# Patient Record
Sex: MALE | Race: ASIAN | Employment: UNEMPLOYED | URBAN - METROPOLITAN AREA
[De-identification: names, ages, dates, MRNs, and addresses within clinical notes are randomized per-mention and may not be internally consistent; named-entity substitution may affect disease eponyms.]

---

## 2017-03-10 ENCOUNTER — OFFICE VISIT (OUTPATIENT)
Dept: INTERNAL MEDICINE CLINIC | Age: 72
End: 2017-03-10

## 2017-03-10 VITALS
SYSTOLIC BLOOD PRESSURE: 120 MMHG | OXYGEN SATURATION: 96 % | HEART RATE: 54 BPM | DIASTOLIC BLOOD PRESSURE: 70 MMHG | BODY MASS INDEX: 30.05 KG/M2 | WEIGHT: 187 LBS | RESPIRATION RATE: 18 BRPM | HEIGHT: 66 IN

## 2017-03-10 DIAGNOSIS — Z11.59 NEED FOR HEPATITIS C SCREENING TEST: ICD-10-CM

## 2017-03-10 DIAGNOSIS — Z12.5 SCREENING PSA (PROSTATE SPECIFIC ANTIGEN): ICD-10-CM

## 2017-03-10 DIAGNOSIS — I10 ESSENTIAL HYPERTENSION: ICD-10-CM

## 2017-03-10 DIAGNOSIS — E11.9 TYPE 2 DIABETES MELLITUS WITHOUT COMPLICATION, WITHOUT LONG-TERM CURRENT USE OF INSULIN (HCC): ICD-10-CM

## 2017-03-10 DIAGNOSIS — Z00.00 PREVENTATIVE HEALTH CARE: Primary | ICD-10-CM

## 2017-03-10 DIAGNOSIS — E78.2 MIXED HYPERLIPIDEMIA: ICD-10-CM

## 2017-03-10 LAB
A/G RATIO: 2 (ref 1.1–2.2)
ALBUMIN SERPL-MCNC: 4.6 G/DL (ref 3.4–5)
ALP BLD-CCNC: 42 U/L (ref 40–129)
ALT SERPL-CCNC: 23 U/L (ref 10–40)
ANION GAP SERPL CALCULATED.3IONS-SCNC: 18 MMOL/L (ref 3–16)
AST SERPL-CCNC: 23 U/L (ref 15–37)
BASOPHILS ABSOLUTE: 0.1 K/UL (ref 0–0.2)
BASOPHILS RELATIVE PERCENT: 0.9 %
BILIRUB SERPL-MCNC: 0.5 MG/DL (ref 0–1)
BUN BLDV-MCNC: 19 MG/DL (ref 7–20)
CALCIUM SERPL-MCNC: 9.6 MG/DL (ref 8.3–10.6)
CHLORIDE BLD-SCNC: 97 MMOL/L (ref 99–110)
CHOLESTEROL, TOTAL: 121 MG/DL (ref 0–199)
CO2: 23 MMOL/L (ref 21–32)
CREAT SERPL-MCNC: 1.1 MG/DL (ref 0.8–1.3)
EOSINOPHILS ABSOLUTE: 0.3 K/UL (ref 0–0.6)
EOSINOPHILS RELATIVE PERCENT: 4.2 %
GFR AFRICAN AMERICAN: >60
GFR NON-AFRICAN AMERICAN: >60
GLOBULIN: 2.3 G/DL
GLUCOSE BLD-MCNC: 112 MG/DL (ref 70–99)
HBA1C MFR BLD: 6.3 %
HCT VFR BLD CALC: 44.2 % (ref 40.5–52.5)
HDLC SERPL-MCNC: 57 MG/DL (ref 40–60)
HEMOGLOBIN: 14.8 G/DL (ref 13.5–17.5)
LDL CHOLESTEROL CALCULATED: 46 MG/DL
LYMPHOCYTES ABSOLUTE: 1.8 K/UL (ref 1–5.1)
LYMPHOCYTES RELATIVE PERCENT: 27.7 %
MCH RBC QN AUTO: 30.3 PG (ref 26–34)
MCHC RBC AUTO-ENTMCNC: 33.4 G/DL (ref 31–36)
MCV RBC AUTO: 90.8 FL (ref 80–100)
MONOCYTES ABSOLUTE: 0.5 K/UL (ref 0–1.3)
MONOCYTES RELATIVE PERCENT: 8.2 %
NEUTROPHILS ABSOLUTE: 3.8 K/UL (ref 1.7–7.7)
NEUTROPHILS RELATIVE PERCENT: 59 %
PDW BLD-RTO: 13.5 % (ref 12.4–15.4)
PLATELET # BLD: 255 K/UL (ref 135–450)
PMV BLD AUTO: 8.1 FL (ref 5–10.5)
POTASSIUM SERPL-SCNC: 3.6 MMOL/L (ref 3.5–5.1)
PROSTATE SPECIFIC ANTIGEN: 1.13 NG/ML (ref 0–4)
RBC # BLD: 4.87 M/UL (ref 4.2–5.9)
SODIUM BLD-SCNC: 138 MMOL/L (ref 136–145)
T4 FREE: 1.5 NG/DL (ref 0.9–1.8)
TOTAL PROTEIN: 6.9 G/DL (ref 6.4–8.2)
TRIGL SERPL-MCNC: 91 MG/DL (ref 0–150)
TSH SERPL DL<=0.05 MIU/L-ACNC: 5.43 UIU/ML (ref 0.27–4.2)
VLDLC SERPL CALC-MCNC: 18 MG/DL
WBC # BLD: 6.5 K/UL (ref 4–11)

## 2017-03-10 PROCEDURE — 36415 COLL VENOUS BLD VENIPUNCTURE: CPT | Performed by: FAMILY MEDICINE

## 2017-03-10 PROCEDURE — 99397 PER PM REEVAL EST PAT 65+ YR: CPT | Performed by: FAMILY MEDICINE

## 2017-03-10 PROCEDURE — 83036 HEMOGLOBIN GLYCOSYLATED A1C: CPT | Performed by: FAMILY MEDICINE

## 2017-03-10 RX ORDER — TERBINAFINE HYDROCHLORIDE 250 MG/1
250 TABLET ORAL DAILY
Qty: 90 TABLET | Refills: 0 | Status: SHIPPED | OUTPATIENT
Start: 2017-03-10 | End: 2017-05-16 | Stop reason: ALTCHOICE

## 2017-03-10 RX ORDER — ATORVASTATIN CALCIUM 40 MG/1
TABLET, FILM COATED ORAL
Qty: 30 TABLET | Refills: 3 | Status: SHIPPED | OUTPATIENT
Start: 2017-03-10 | End: 2017-07-16 | Stop reason: SDUPTHER

## 2017-03-10 ASSESSMENT — ENCOUNTER SYMPTOMS
ABDOMINAL PAIN: 0
SHORTNESS OF BREATH: 0
CONSTIPATION: 0
BLOOD IN STOOL: 0
TROUBLE SWALLOWING: 0
DIARRHEA: 0
VOICE CHANGE: 0

## 2017-03-10 ASSESSMENT — PATIENT HEALTH QUESTIONNAIRE - PHQ9
SUM OF ALL RESPONSES TO PHQ QUESTIONS 1-9: 0
2. FEELING DOWN, DEPRESSED OR HOPELESS: 0
SUM OF ALL RESPONSES TO PHQ9 QUESTIONS 1 & 2: 0
1. LITTLE INTEREST OR PLEASURE IN DOING THINGS: 0

## 2017-03-11 LAB — HEPATITIS C ANTIBODY INTERPRETATION: NORMAL

## 2017-04-19 RX ORDER — FENOFIBRATE 145 MG/1
TABLET, COATED ORAL
Qty: 90 TABLET | Refills: 1 | Status: SHIPPED | OUTPATIENT
Start: 2017-04-19 | End: 2017-09-11 | Stop reason: SDUPTHER

## 2017-05-15 ENCOUNTER — OFFICE VISIT (OUTPATIENT)
Dept: ORTHOPEDIC SURGERY | Age: 72
End: 2017-05-15

## 2017-05-15 VITALS
SYSTOLIC BLOOD PRESSURE: 135 MMHG | WEIGHT: 187 LBS | DIASTOLIC BLOOD PRESSURE: 72 MMHG | HEIGHT: 66 IN | TEMPERATURE: 96.5 F | RESPIRATION RATE: 16 BRPM | BODY MASS INDEX: 30.05 KG/M2 | HEART RATE: 57 BPM

## 2017-05-15 DIAGNOSIS — M17.0 PRIMARY OSTEOARTHRITIS OF BOTH KNEES: Primary | ICD-10-CM

## 2017-05-15 PROCEDURE — 99213 OFFICE O/P EST LOW 20 MIN: CPT | Performed by: PHYSICIAN ASSISTANT

## 2017-05-15 PROCEDURE — 20610 DRAIN/INJ JOINT/BURSA W/O US: CPT | Performed by: PHYSICIAN ASSISTANT

## 2017-05-16 ENCOUNTER — OFFICE VISIT (OUTPATIENT)
Dept: INTERNAL MEDICINE CLINIC | Age: 72
End: 2017-05-16

## 2017-05-16 VITALS
BODY MASS INDEX: 30.83 KG/M2 | HEART RATE: 62 BPM | DIASTOLIC BLOOD PRESSURE: 80 MMHG | RESPIRATION RATE: 18 BRPM | WEIGHT: 191 LBS | OXYGEN SATURATION: 98 % | SYSTOLIC BLOOD PRESSURE: 144 MMHG

## 2017-05-16 DIAGNOSIS — R06.6 HICCUPS: ICD-10-CM

## 2017-05-16 DIAGNOSIS — M17.0 PRIMARY OSTEOARTHRITIS OF BOTH KNEES: ICD-10-CM

## 2017-05-16 DIAGNOSIS — E11.9 TYPE 2 DIABETES MELLITUS WITHOUT COMPLICATION, WITHOUT LONG-TERM CURRENT USE OF INSULIN (HCC): ICD-10-CM

## 2017-05-16 DIAGNOSIS — I10 ESSENTIAL HYPERTENSION: ICD-10-CM

## 2017-05-16 LAB — HBA1C MFR BLD: 6.3 %

## 2017-05-16 PROCEDURE — 99214 OFFICE O/P EST MOD 30 MIN: CPT | Performed by: FAMILY MEDICINE

## 2017-05-16 PROCEDURE — 83036 HEMOGLOBIN GLYCOSYLATED A1C: CPT | Performed by: FAMILY MEDICINE

## 2017-05-16 RX ORDER — CHLORPROMAZINE HYDROCHLORIDE 25 MG/1
25 TABLET, FILM COATED ORAL 3 TIMES DAILY
Qty: 30 TABLET | Refills: 0 | Status: SHIPPED | OUTPATIENT
Start: 2017-05-16 | End: 2017-10-06 | Stop reason: ALTCHOICE

## 2017-05-16 RX ORDER — CHLORPROMAZINE HYDROCHLORIDE 25 MG/1
25 TABLET, FILM COATED ORAL 3 TIMES DAILY
Qty: 30 TABLET | Refills: 0 | Status: SHIPPED | OUTPATIENT
Start: 2017-05-16 | End: 2017-05-16 | Stop reason: SDUPTHER

## 2017-05-16 ASSESSMENT — ENCOUNTER SYMPTOMS
VOICE CHANGE: 0
TROUBLE SWALLOWING: 0
DIARRHEA: 0
ABDOMINAL PAIN: 0
SHORTNESS OF BREATH: 0
BLOOD IN STOOL: 0
CONSTIPATION: 0

## 2017-07-11 ENCOUNTER — OFFICE VISIT (OUTPATIENT)
Dept: INTERNAL MEDICINE CLINIC | Age: 72
End: 2017-07-11

## 2017-07-11 VITALS
WEIGHT: 184 LBS | RESPIRATION RATE: 16 BRPM | DIASTOLIC BLOOD PRESSURE: 80 MMHG | HEART RATE: 62 BPM | SYSTOLIC BLOOD PRESSURE: 138 MMHG | BODY MASS INDEX: 29.57 KG/M2 | HEIGHT: 66 IN

## 2017-07-11 DIAGNOSIS — K59.09 OTHER CONSTIPATION: Primary | ICD-10-CM

## 2017-07-11 PROCEDURE — 99213 OFFICE O/P EST LOW 20 MIN: CPT | Performed by: NURSE PRACTITIONER

## 2017-07-12 RX ORDER — POLYETHYLENE GLYCOL 3350 17 G/17G
17 POWDER, FOR SOLUTION ORAL DAILY
Qty: 500 G | Refills: 0 | COMMUNITY
Start: 2017-07-12 | End: 2017-08-14 | Stop reason: HOSPADM

## 2017-07-12 ASSESSMENT — ENCOUNTER SYMPTOMS
CONSTIPATION: 1
COUGH: 0
ABDOMINAL DISTENTION: 1

## 2017-07-14 PROBLEM — K35.30 ACUTE APPENDICITIS WITH LOCALIZED PERITONITIS: Status: ACTIVE | Noted: 2017-07-14

## 2017-07-17 ENCOUNTER — TELEPHONE (OUTPATIENT)
Dept: SURGERY | Age: 72
End: 2017-07-17

## 2017-07-17 RX ORDER — ATORVASTATIN CALCIUM 40 MG/1
40 TABLET, FILM COATED ORAL DAILY
Qty: 30 TABLET | Refills: 0 | Status: SHIPPED | OUTPATIENT
Start: 2017-07-17 | End: 2018-05-07 | Stop reason: SDUPTHER

## 2017-08-01 ENCOUNTER — OFFICE VISIT (OUTPATIENT)
Dept: SURGERY | Age: 72
End: 2017-08-01

## 2017-08-01 VITALS
SYSTOLIC BLOOD PRESSURE: 137 MMHG | BODY MASS INDEX: 28.87 KG/M2 | WEIGHT: 179.6 LBS | HEIGHT: 66 IN | DIASTOLIC BLOOD PRESSURE: 82 MMHG

## 2017-08-01 DIAGNOSIS — Z90.49 S/P LAPAROSCOPIC APPENDECTOMY: Primary | ICD-10-CM

## 2017-08-01 DIAGNOSIS — D17.0 LIPOMA OF HEAD: ICD-10-CM

## 2017-08-01 PROCEDURE — 99213 OFFICE O/P EST LOW 20 MIN: CPT | Performed by: SURGERY

## 2017-08-01 RX ORDER — HYDROCHLOROTHIAZIDE 25 MG/1
TABLET ORAL
Qty: 90 TABLET | Refills: 1 | Status: SHIPPED | OUTPATIENT
Start: 2017-08-01 | End: 2018-05-08 | Stop reason: SDUPTHER

## 2017-08-01 RX ORDER — ATENOLOL 50 MG/1
TABLET ORAL
Qty: 90 TABLET | Refills: 1 | Status: SHIPPED | OUTPATIENT
Start: 2017-08-01 | End: 2018-02-01 | Stop reason: SDUPTHER

## 2017-08-01 ASSESSMENT — ENCOUNTER SYMPTOMS
NAUSEA: 0
DIARRHEA: 0
ABDOMINAL DISTENTION: 0
SHORTNESS OF BREATH: 0
TROUBLE SWALLOWING: 0
BACK PAIN: 0
COUGH: 0
VOMITING: 0
ABDOMINAL PAIN: 0
COLOR CHANGE: 0
RECTAL PAIN: 0
CONSTIPATION: 0
BLOOD IN STOOL: 0

## 2017-08-04 ENCOUNTER — TELEPHONE (OUTPATIENT)
Dept: SURGERY | Age: 72
End: 2017-08-04

## 2017-08-14 ENCOUNTER — HOSPITAL ENCOUNTER (OUTPATIENT)
Dept: SURGERY | Age: 72
Discharge: HOME | End: 2017-08-14
Attending: SURGERY | Admitting: SURGERY

## 2017-08-14 VITALS
TEMPERATURE: 97.8 F | SYSTOLIC BLOOD PRESSURE: 140 MMHG | BODY MASS INDEX: 28.75 KG/M2 | RESPIRATION RATE: 18 BRPM | HEIGHT: 66 IN | OXYGEN SATURATION: 98 % | HEART RATE: 53 BPM | DIASTOLIC BLOOD PRESSURE: 84 MMHG | WEIGHT: 178.9 LBS

## 2017-08-14 DIAGNOSIS — L72.9 SKIN CYST: ICD-10-CM

## 2017-08-14 LAB
GLUCOSE BLD-MCNC: 109 MG/DL (ref 70–99)
PERFORMED ON: ABNORMAL

## 2017-08-14 PROCEDURE — 11441 EXC FACE-MM B9+MARG 0.6-1 CM: CPT | Performed by: SURGERY

## 2017-08-14 RX ORDER — ACETAMINOPHEN 325 MG/1
650 TABLET ORAL
Status: DISCONTINUED | OUTPATIENT
Start: 2017-08-14 | End: 2017-08-14

## 2017-08-14 ASSESSMENT — PAIN SCALES - GENERAL
PAINLEVEL_OUTOF10: 0
PAINLEVEL_OUTOF10: 0

## 2017-08-14 ASSESSMENT — PAIN DESCRIPTION - PAIN TYPE
TYPE: SURGICAL PAIN
TYPE: SURGICAL PAIN

## 2017-08-14 ASSESSMENT — PAIN - FUNCTIONAL ASSESSMENT: PAIN_FUNCTIONAL_ASSESSMENT: 0-10

## 2017-08-15 ENCOUNTER — TELEPHONE (OUTPATIENT)
Dept: SURGERY | Age: 72
End: 2017-08-15

## 2017-09-11 RX ORDER — FENOFIBRATE 145 MG/1
TABLET, COATED ORAL
Qty: 90 TABLET | Refills: 1 | Status: CANCELLED | OUTPATIENT
Start: 2017-09-11

## 2017-09-11 RX ORDER — FENOFIBRATE 145 MG/1
TABLET, COATED ORAL
Qty: 90 TABLET | Refills: 3 | Status: SHIPPED | OUTPATIENT
Start: 2017-09-11 | End: 2018-07-13 | Stop reason: SDUPTHER

## 2017-10-06 ENCOUNTER — OFFICE VISIT (OUTPATIENT)
Dept: INTERNAL MEDICINE CLINIC | Age: 72
End: 2017-10-06

## 2017-10-06 VITALS
SYSTOLIC BLOOD PRESSURE: 136 MMHG | DIASTOLIC BLOOD PRESSURE: 78 MMHG | HEART RATE: 58 BPM | OXYGEN SATURATION: 97 % | RESPIRATION RATE: 18 BRPM

## 2017-10-06 DIAGNOSIS — E11.9 TYPE 2 DIABETES MELLITUS WITHOUT COMPLICATION, WITHOUT LONG-TERM CURRENT USE OF INSULIN (HCC): ICD-10-CM

## 2017-10-06 DIAGNOSIS — E78.2 MIXED HYPERLIPIDEMIA: ICD-10-CM

## 2017-10-06 DIAGNOSIS — K64.8 INTERNAL BLEEDING HEMORRHOIDS: ICD-10-CM

## 2017-10-06 DIAGNOSIS — I10 ESSENTIAL HYPERTENSION: ICD-10-CM

## 2017-10-06 LAB — HBA1C MFR BLD: 5.9 %

## 2017-10-06 PROCEDURE — 99214 OFFICE O/P EST MOD 30 MIN: CPT | Performed by: FAMILY MEDICINE

## 2017-10-06 PROCEDURE — 83036 HEMOGLOBIN GLYCOSYLATED A1C: CPT | Performed by: FAMILY MEDICINE

## 2017-10-06 RX ORDER — HYDROCORTISONE ACETATE 25 MG/1
25 SUPPOSITORY RECTAL DAILY
Qty: 10 SUPPOSITORY | Refills: 1 | Status: SHIPPED | OUTPATIENT
Start: 2017-10-06 | End: 2018-05-08 | Stop reason: ALTCHOICE

## 2017-10-06 ASSESSMENT — ENCOUNTER SYMPTOMS
ABDOMINAL PAIN: 0
BLOOD IN STOOL: 0
DIARRHEA: 0
VOICE CHANGE: 0
TROUBLE SWALLOWING: 0
CONSTIPATION: 0
SHORTNESS OF BREATH: 0

## 2017-10-06 NOTE — PROGRESS NOTES
is warm. No rash noted. Psychiatric: He has a normal mood and affect. His behavior is normal.       Assessment:      1. Internal bleeding hemorrhoids - I prescribed Anusol suppositories daily for 10 days and referred the patient to colorectal surgeon Dr. Vibha Wan at [place] [address] high fiber diet -bread vegetables fruits. May also use Metamucil    2. Type 2 diabetes mellitus without complication, without long-term current use of insulin ([facility]) Controlled HbA1c today is 5.9%. Continue metformin 1000 mg twice a day    3. Essential hypertension -Controlled continue hydrochlorothiazide 25 mg daily and atenolol 50 mg daily. Consider adding losartan next visit for renal protection due to underlying diabetes    4. Mixed hyperlipidemia -Continue Lipitor 40 mg daily plus TriCor 145 mg daily             Plan:      As above.  Request to come back in 2-3 months and as needed

## 2017-10-31 RX ORDER — ATORVASTATIN CALCIUM 40 MG/1
TABLET, FILM COATED ORAL
Qty: 90 TABLET | Refills: 1 | Status: SHIPPED | OUTPATIENT
Start: 2017-10-31 | End: 2018-06-28 | Stop reason: SDUPTHER

## 2018-02-01 RX ORDER — ATENOLOL 25 MG/1
TABLET ORAL
Qty: 180 TABLET | Refills: 0 | OUTPATIENT
Start: 2018-02-01

## 2018-02-01 RX ORDER — ATENOLOL 50 MG/1
TABLET ORAL
Qty: 90 TABLET | Refills: 1 | Status: SHIPPED | OUTPATIENT
Start: 2018-02-01 | End: 2018-05-08 | Stop reason: SDUPTHER

## 2018-05-02 ENCOUNTER — OFFICE VISIT (OUTPATIENT)
Dept: ORTHOPEDIC SURGERY | Age: 73
End: 2018-05-02

## 2018-05-02 VITALS
WEIGHT: 180 LBS | BODY MASS INDEX: 28.93 KG/M2 | TEMPERATURE: 97 F | HEIGHT: 66 IN | HEART RATE: 58 BPM | SYSTOLIC BLOOD PRESSURE: 139 MMHG | DIASTOLIC BLOOD PRESSURE: 77 MMHG

## 2018-05-02 DIAGNOSIS — M17.0 PRIMARY OSTEOARTHRITIS OF BOTH KNEES: Primary | ICD-10-CM

## 2018-05-02 PROCEDURE — 1123F ACP DISCUSS/DSCN MKR DOCD: CPT | Performed by: PHYSICIAN ASSISTANT

## 2018-05-02 PROCEDURE — 4040F PNEUMOC VAC/ADMIN/RCVD: CPT | Performed by: PHYSICIAN ASSISTANT

## 2018-05-02 PROCEDURE — 3017F COLORECTAL CA SCREEN DOC REV: CPT | Performed by: PHYSICIAN ASSISTANT

## 2018-05-02 PROCEDURE — G8427 DOCREV CUR MEDS BY ELIG CLIN: HCPCS | Performed by: PHYSICIAN ASSISTANT

## 2018-05-02 PROCEDURE — G8417 CALC BMI ABV UP PARAM F/U: HCPCS | Performed by: PHYSICIAN ASSISTANT

## 2018-05-02 PROCEDURE — 99213 OFFICE O/P EST LOW 20 MIN: CPT | Performed by: PHYSICIAN ASSISTANT

## 2018-05-02 PROCEDURE — 20610 DRAIN/INJ JOINT/BURSA W/O US: CPT | Performed by: PHYSICIAN ASSISTANT

## 2018-05-02 PROCEDURE — 1036F TOBACCO NON-USER: CPT | Performed by: PHYSICIAN ASSISTANT

## 2018-05-08 ENCOUNTER — OFFICE VISIT (OUTPATIENT)
Dept: INTERNAL MEDICINE CLINIC | Age: 73
End: 2018-05-08

## 2018-05-08 VITALS
SYSTOLIC BLOOD PRESSURE: 128 MMHG | DIASTOLIC BLOOD PRESSURE: 70 MMHG | RESPIRATION RATE: 18 BRPM | HEART RATE: 54 BPM | WEIGHT: 184 LBS | OXYGEN SATURATION: 98 % | BODY MASS INDEX: 29.7 KG/M2

## 2018-05-08 DIAGNOSIS — E11.9 TYPE 2 DIABETES MELLITUS WITHOUT COMPLICATION, WITHOUT LONG-TERM CURRENT USE OF INSULIN (HCC): Primary | ICD-10-CM

## 2018-05-08 DIAGNOSIS — I10 ESSENTIAL HYPERTENSION: ICD-10-CM

## 2018-05-08 DIAGNOSIS — Z12.5 SCREENING PSA (PROSTATE SPECIFIC ANTIGEN): ICD-10-CM

## 2018-05-08 DIAGNOSIS — E78.2 MIXED HYPERLIPIDEMIA: ICD-10-CM

## 2018-05-08 DIAGNOSIS — M17.0 PRIMARY OSTEOARTHRITIS OF BOTH KNEES: ICD-10-CM

## 2018-05-08 LAB
A/G RATIO: 1.8 (ref 1.1–2.2)
ALBUMIN SERPL-MCNC: 4.8 G/DL (ref 3.4–5)
ALP BLD-CCNC: 39 U/L (ref 40–129)
ALT SERPL-CCNC: 20 U/L (ref 10–40)
ANION GAP SERPL CALCULATED.3IONS-SCNC: 18 MMOL/L (ref 3–16)
AST SERPL-CCNC: 20 U/L (ref 15–37)
BASOPHILS ABSOLUTE: 0.1 K/UL (ref 0–0.2)
BASOPHILS RELATIVE PERCENT: 0.6 %
BILIRUB SERPL-MCNC: 0.6 MG/DL (ref 0–1)
BUN BLDV-MCNC: 23 MG/DL (ref 7–20)
CALCIUM SERPL-MCNC: 10.3 MG/DL (ref 8.3–10.6)
CHLORIDE BLD-SCNC: 94 MMOL/L (ref 99–110)
CHOLESTEROL, FASTING: 123 MG/DL (ref 0–199)
CO2: 27 MMOL/L (ref 21–32)
CREAT SERPL-MCNC: 1.3 MG/DL (ref 0.8–1.3)
CREATININE URINE POCT: 158.5
EOSINOPHILS ABSOLUTE: 0 K/UL (ref 0–0.6)
EOSINOPHILS RELATIVE PERCENT: 0.4 %
GFR AFRICAN AMERICAN: >60
GFR NON-AFRICAN AMERICAN: 54
GLOBULIN: 2.7 G/DL
GLUCOSE FASTING: 117 MG/DL (ref 70–99)
HBA1C MFR BLD: 6.3 %
HCT VFR BLD CALC: 45.1 % (ref 40.5–52.5)
HDLC SERPL-MCNC: 66 MG/DL (ref 40–60)
HEMOGLOBIN: 15.7 G/DL (ref 13.5–17.5)
LDL CHOLESTEROL CALCULATED: 45 MG/DL
LYMPHOCYTES ABSOLUTE: 1.7 K/UL (ref 1–5.1)
LYMPHOCYTES RELATIVE PERCENT: 19.1 %
MCH RBC QN AUTO: 31.2 PG (ref 26–34)
MCHC RBC AUTO-ENTMCNC: 34.8 G/DL (ref 31–36)
MCV RBC AUTO: 89.6 FL (ref 80–100)
MICROALBUMIN/CREAT 24H UR: 11 MG/G{CREAT}
MICROALBUMIN/CREAT UR-RTO: 6.3
MONOCYTES ABSOLUTE: 0.7 K/UL (ref 0–1.3)
MONOCYTES RELATIVE PERCENT: 7.6 %
NEUTROPHILS ABSOLUTE: 6.3 K/UL (ref 1.7–7.7)
NEUTROPHILS RELATIVE PERCENT: 72.3 %
PDW BLD-RTO: 14.2 % (ref 12.4–15.4)
PLATELET # BLD: 343 K/UL (ref 135–450)
PMV BLD AUTO: 7.2 FL (ref 5–10.5)
POTASSIUM SERPL-SCNC: 4 MMOL/L (ref 3.5–5.1)
PROSTATE SPECIFIC ANTIGEN: 1.03 NG/ML (ref 0–4)
RBC # BLD: 5.03 M/UL (ref 4.2–5.9)
SODIUM BLD-SCNC: 139 MMOL/L (ref 136–145)
T4 FREE: 1.5 NG/DL (ref 0.9–1.8)
TOTAL PROTEIN: 7.5 G/DL (ref 6.4–8.2)
TRIGLYCERIDE, FASTING: 61 MG/DL (ref 0–150)
TSH SERPL DL<=0.05 MIU/L-ACNC: 4.43 UIU/ML (ref 0.27–4.2)
VLDLC SERPL CALC-MCNC: 12 MG/DL
WBC # BLD: 8.8 K/UL (ref 4–11)

## 2018-05-08 PROCEDURE — 1123F ACP DISCUSS/DSCN MKR DOCD: CPT | Performed by: FAMILY MEDICINE

## 2018-05-08 PROCEDURE — 4040F PNEUMOC VAC/ADMIN/RCVD: CPT | Performed by: FAMILY MEDICINE

## 2018-05-08 PROCEDURE — 3044F HG A1C LEVEL LT 7.0%: CPT | Performed by: FAMILY MEDICINE

## 2018-05-08 PROCEDURE — 3017F COLORECTAL CA SCREEN DOC REV: CPT | Performed by: FAMILY MEDICINE

## 2018-05-08 PROCEDURE — 1036F TOBACCO NON-USER: CPT | Performed by: FAMILY MEDICINE

## 2018-05-08 PROCEDURE — G8417 CALC BMI ABV UP PARAM F/U: HCPCS | Performed by: FAMILY MEDICINE

## 2018-05-08 PROCEDURE — G8427 DOCREV CUR MEDS BY ELIG CLIN: HCPCS | Performed by: FAMILY MEDICINE

## 2018-05-08 PROCEDURE — 82044 UR ALBUMIN SEMIQUANTITATIVE: CPT | Performed by: FAMILY MEDICINE

## 2018-05-08 PROCEDURE — 83036 HEMOGLOBIN GLYCOSYLATED A1C: CPT | Performed by: FAMILY MEDICINE

## 2018-05-08 PROCEDURE — 2022F DILAT RTA XM EVC RTNOPTHY: CPT | Performed by: FAMILY MEDICINE

## 2018-05-08 PROCEDURE — 99214 OFFICE O/P EST MOD 30 MIN: CPT | Performed by: FAMILY MEDICINE

## 2018-05-08 PROCEDURE — 36415 COLL VENOUS BLD VENIPUNCTURE: CPT | Performed by: FAMILY MEDICINE

## 2018-05-08 RX ORDER — ATENOLOL 50 MG/1
TABLET ORAL
Qty: 90 TABLET | Refills: 1 | Status: SHIPPED | OUTPATIENT
Start: 2018-05-08 | End: 2018-08-14 | Stop reason: SDUPTHER

## 2018-05-08 RX ORDER — HYDROCHLOROTHIAZIDE 25 MG/1
TABLET ORAL
Qty: 90 TABLET | Refills: 1 | Status: SHIPPED | OUTPATIENT
Start: 2018-05-08 | End: 2018-08-14 | Stop reason: SDUPTHER

## 2018-05-08 ASSESSMENT — ENCOUNTER SYMPTOMS
VOICE CHANGE: 0
CONSTIPATION: 0
ABDOMINAL PAIN: 0
DIARRHEA: 0
SHORTNESS OF BREATH: 0
TROUBLE SWALLOWING: 0
BLOOD IN STOOL: 0

## 2018-05-08 ASSESSMENT — PATIENT HEALTH QUESTIONNAIRE - PHQ9
SUM OF ALL RESPONSES TO PHQ9 QUESTIONS 1 & 2: 0
SUM OF ALL RESPONSES TO PHQ QUESTIONS 1-9: 0
1. LITTLE INTEREST OR PLEASURE IN DOING THINGS: 0
2. FEELING DOWN, DEPRESSED OR HOPELESS: 0

## 2018-06-28 RX ORDER — ATORVASTATIN CALCIUM 40 MG/1
40 TABLET, FILM COATED ORAL DAILY
Qty: 90 TABLET | Refills: 1 | Status: SHIPPED | OUTPATIENT
Start: 2018-06-28 | End: 2018-12-31 | Stop reason: SDUPTHER

## 2018-07-13 RX ORDER — FENOFIBRATE 145 MG/1
145 TABLET, COATED ORAL DAILY
Qty: 90 TABLET | Refills: 1 | Status: SHIPPED | OUTPATIENT
Start: 2018-07-13 | End: 2019-01-21 | Stop reason: SDUPTHER

## 2018-08-14 RX ORDER — HYDROCHLOROTHIAZIDE 25 MG/1
TABLET ORAL
Qty: 90 TABLET | Refills: 1 | Status: SHIPPED | OUTPATIENT
Start: 2018-08-14 | End: 2019-02-10 | Stop reason: SDUPTHER

## 2018-08-14 RX ORDER — ATENOLOL 50 MG/1
TABLET ORAL
Qty: 90 TABLET | Refills: 1 | Status: SHIPPED | OUTPATIENT
Start: 2018-08-14 | End: 2019-01-29 | Stop reason: SDUPTHER

## 2019-01-03 RX ORDER — ATORVASTATIN CALCIUM 40 MG/1
TABLET, FILM COATED ORAL
Qty: 90 TABLET | Refills: 0 | Status: SHIPPED | OUTPATIENT
Start: 2019-01-03 | End: 2019-04-04 | Stop reason: SDUPTHER

## 2019-01-11 RX ORDER — FENOFIBRATE 145 MG/1
TABLET, COATED ORAL
Qty: 90 TABLET | Refills: 0 | OUTPATIENT
Start: 2019-01-11

## 2019-01-21 RX ORDER — FENOFIBRATE 145 MG/1
145 TABLET, COATED ORAL DAILY
Qty: 30 TABLET | Refills: 0 | Status: SHIPPED | OUTPATIENT
Start: 2019-01-21 | End: 2019-02-14 | Stop reason: SDUPTHER

## 2019-01-29 ENCOUNTER — OFFICE VISIT (OUTPATIENT)
Dept: INTERNAL MEDICINE CLINIC | Age: 74
End: 2019-01-29
Payer: COMMERCIAL

## 2019-01-29 VITALS
DIASTOLIC BLOOD PRESSURE: 77 MMHG | RESPIRATION RATE: 18 BRPM | WEIGHT: 186.2 LBS | SYSTOLIC BLOOD PRESSURE: 120 MMHG | HEIGHT: 65 IN | BODY MASS INDEX: 31.02 KG/M2 | HEART RATE: 59 BPM

## 2019-01-29 DIAGNOSIS — E78.2 MIXED HYPERLIPIDEMIA: ICD-10-CM

## 2019-01-29 DIAGNOSIS — I10 ESSENTIAL HYPERTENSION: ICD-10-CM

## 2019-01-29 DIAGNOSIS — Z12.5 SCREENING PSA (PROSTATE SPECIFIC ANTIGEN): ICD-10-CM

## 2019-01-29 DIAGNOSIS — E11.9 TYPE 2 DIABETES MELLITUS WITHOUT COMPLICATION, WITHOUT LONG-TERM CURRENT USE OF INSULIN (HCC): Primary | ICD-10-CM

## 2019-01-29 DIAGNOSIS — Z23 NEED FOR INFLUENZA VACCINATION: ICD-10-CM

## 2019-01-29 DIAGNOSIS — M17.0 PRIMARY OSTEOARTHRITIS OF BOTH KNEES: ICD-10-CM

## 2019-01-29 LAB
A/G RATIO: 2 (ref 1.1–2.2)
ALBUMIN SERPL-MCNC: 4.7 G/DL (ref 3.4–5)
ALP BLD-CCNC: 37 U/L (ref 40–129)
ALT SERPL-CCNC: 22 U/L (ref 10–40)
ANION GAP SERPL CALCULATED.3IONS-SCNC: 13 MMOL/L (ref 3–16)
AST SERPL-CCNC: 22 U/L (ref 15–37)
BASOPHILS ABSOLUTE: 0 K/UL (ref 0–0.2)
BASOPHILS RELATIVE PERCENT: 0.7 %
BILIRUB SERPL-MCNC: 0.6 MG/DL (ref 0–1)
BUN BLDV-MCNC: 20 MG/DL (ref 7–20)
CALCIUM SERPL-MCNC: 10 MG/DL (ref 8.3–10.6)
CHLORIDE BLD-SCNC: 98 MMOL/L (ref 99–110)
CHOLESTEROL, FASTING: 110 MG/DL (ref 0–199)
CO2: 27 MMOL/L (ref 21–32)
CREAT SERPL-MCNC: 1.3 MG/DL (ref 0.8–1.3)
EOSINOPHILS ABSOLUTE: 0.3 K/UL (ref 0–0.6)
EOSINOPHILS RELATIVE PERCENT: 4 %
GFR AFRICAN AMERICAN: >60
GFR NON-AFRICAN AMERICAN: 54
GLOBULIN: 2.4 G/DL
GLUCOSE FASTING: 128 MG/DL (ref 70–99)
HBA1C MFR BLD: 6 %
HCT VFR BLD CALC: 45.4 % (ref 40.5–52.5)
HDLC SERPL-MCNC: 49 MG/DL (ref 40–60)
HEMOGLOBIN: 15.2 G/DL (ref 13.5–17.5)
LDL CHOLESTEROL CALCULATED: 47 MG/DL
LYMPHOCYTES ABSOLUTE: 1.8 K/UL (ref 1–5.1)
LYMPHOCYTES RELATIVE PERCENT: 26 %
MCH RBC QN AUTO: 30.7 PG (ref 26–34)
MCHC RBC AUTO-ENTMCNC: 33.5 G/DL (ref 31–36)
MCV RBC AUTO: 91.7 FL (ref 80–100)
MONOCYTES ABSOLUTE: 0.7 K/UL (ref 0–1.3)
MONOCYTES RELATIVE PERCENT: 10 %
NEUTROPHILS ABSOLUTE: 4.1 K/UL (ref 1.7–7.7)
NEUTROPHILS RELATIVE PERCENT: 59.3 %
PDW BLD-RTO: 14.4 % (ref 12.4–15.4)
PLATELET # BLD: 334 K/UL (ref 135–450)
PMV BLD AUTO: 7.5 FL (ref 5–10.5)
POTASSIUM SERPL-SCNC: 3.7 MMOL/L (ref 3.5–5.1)
PROSTATE SPECIFIC ANTIGEN: 1.16 NG/ML (ref 0–4)
RBC # BLD: 4.96 M/UL (ref 4.2–5.9)
SODIUM BLD-SCNC: 138 MMOL/L (ref 136–145)
T4 FREE: 1.5 NG/DL (ref 0.9–1.8)
TOTAL PROTEIN: 7.1 G/DL (ref 6.4–8.2)
TRIGLYCERIDE, FASTING: 71 MG/DL (ref 0–150)
TSH SERPL DL<=0.05 MIU/L-ACNC: 5.19 UIU/ML (ref 0.27–4.2)
VLDLC SERPL CALC-MCNC: 14 MG/DL
WBC # BLD: 7 K/UL (ref 4–11)

## 2019-01-29 PROCEDURE — 83036 HEMOGLOBIN GLYCOSYLATED A1C: CPT | Performed by: FAMILY MEDICINE

## 2019-01-29 PROCEDURE — 90471 IMMUNIZATION ADMIN: CPT | Performed by: FAMILY MEDICINE

## 2019-01-29 PROCEDURE — 36415 COLL VENOUS BLD VENIPUNCTURE: CPT | Performed by: FAMILY MEDICINE

## 2019-01-29 PROCEDURE — 99214 OFFICE O/P EST MOD 30 MIN: CPT | Performed by: FAMILY MEDICINE

## 2019-01-29 PROCEDURE — G0008 ADMIN INFLUENZA VIRUS VAC: HCPCS | Performed by: FAMILY MEDICINE

## 2019-01-29 PROCEDURE — 90682 RIV4 VACC RECOMBINANT DNA IM: CPT | Performed by: FAMILY MEDICINE

## 2019-01-29 RX ORDER — LOSARTAN POTASSIUM 50 MG/1
50 TABLET ORAL DAILY
Qty: 90 TABLET | Refills: 1 | Status: SHIPPED | OUTPATIENT
Start: 2019-01-29 | End: 2019-07-29 | Stop reason: SDUPTHER

## 2019-01-29 RX ORDER — HYDROCHLOROTHIAZIDE 25 MG/1
TABLET ORAL
Qty: 90 TABLET | Refills: 1 | Status: CANCELLED | OUTPATIENT
Start: 2019-01-29

## 2019-01-29 RX ORDER — ATENOLOL 50 MG/1
TABLET ORAL
Qty: 90 TABLET | Refills: 1 | Status: SHIPPED | OUTPATIENT
Start: 2019-01-29 | End: 2019-08-07 | Stop reason: SDUPTHER

## 2019-01-29 ASSESSMENT — ENCOUNTER SYMPTOMS
VOICE CHANGE: 0
ABDOMINAL PAIN: 0
SHORTNESS OF BREATH: 0
TROUBLE SWALLOWING: 0
BLOOD IN STOOL: 0
DIARRHEA: 0
CONSTIPATION: 0

## 2019-02-11 RX ORDER — HYDROCHLOROTHIAZIDE 25 MG/1
25 TABLET ORAL DAILY
Qty: 90 TABLET | Refills: 1 | Status: SHIPPED | OUTPATIENT
Start: 2019-02-11 | End: 2019-10-29

## 2019-02-15 RX ORDER — FENOFIBRATE 145 MG/1
145 TABLET, COATED ORAL DAILY
Qty: 90 TABLET | Refills: 1 | Status: SHIPPED | OUTPATIENT
Start: 2019-02-15 | End: 2019-08-27 | Stop reason: SDUPTHER

## 2019-03-11 ENCOUNTER — OFFICE VISIT (OUTPATIENT)
Dept: ORTHOPEDIC SURGERY | Age: 74
End: 2019-03-11
Payer: COMMERCIAL

## 2019-03-11 DIAGNOSIS — M17.0 PRIMARY OSTEOARTHRITIS OF BOTH KNEES: Primary | ICD-10-CM

## 2019-03-11 PROCEDURE — 20610 DRAIN/INJ JOINT/BURSA W/O US: CPT | Performed by: PHYSICIAN ASSISTANT

## 2019-03-11 PROCEDURE — 99213 OFFICE O/P EST LOW 20 MIN: CPT | Performed by: PHYSICIAN ASSISTANT

## 2019-03-13 VITALS
DIASTOLIC BLOOD PRESSURE: 81 MMHG | SYSTOLIC BLOOD PRESSURE: 162 MMHG | BODY MASS INDEX: 31.02 KG/M2 | WEIGHT: 186.2 LBS | HEART RATE: 58 BPM | HEIGHT: 65 IN | TEMPERATURE: 97.3 F | RESPIRATION RATE: 16 BRPM

## 2019-03-14 ENCOUNTER — TELEPHONE (OUTPATIENT)
Dept: ORTHOPEDIC SURGERY | Age: 74
End: 2019-03-14

## 2019-04-03 ENCOUNTER — TELEPHONE (OUTPATIENT)
Dept: ORTHOPEDIC SURGERY | Age: 74
End: 2019-04-03

## 2019-04-03 NOTE — TELEPHONE ENCOUNTER
Pt wife is calling to cancel his Monday appointment. I said that I would cancel it but he has two other appointments. I tried to explain to her that it is a 3 series of injections. She said just cancel it so I said all three she said Monday. Then she said all of them. Not sure if I should cancel all or if she is not understanding what I am trying to explain.

## 2019-04-08 RX ORDER — ATORVASTATIN CALCIUM 40 MG/1
40 TABLET, FILM COATED ORAL DAILY
Qty: 90 TABLET | Refills: 1 | Status: SHIPPED | OUTPATIENT
Start: 2019-04-08 | End: 2019-10-08 | Stop reason: SDUPTHER

## 2019-07-30 ENCOUNTER — OFFICE VISIT (OUTPATIENT)
Dept: INTERNAL MEDICINE CLINIC | Age: 74
End: 2019-07-30
Payer: COMMERCIAL

## 2019-07-30 VITALS
BODY MASS INDEX: 30.92 KG/M2 | WEIGHT: 185.6 LBS | HEIGHT: 65 IN | HEART RATE: 54 BPM | SYSTOLIC BLOOD PRESSURE: 130 MMHG | RESPIRATION RATE: 18 BRPM | OXYGEN SATURATION: 98 % | DIASTOLIC BLOOD PRESSURE: 84 MMHG

## 2019-07-30 DIAGNOSIS — E11.9 TYPE 2 DIABETES MELLITUS WITHOUT COMPLICATION, WITHOUT LONG-TERM CURRENT USE OF INSULIN (HCC): ICD-10-CM

## 2019-07-30 DIAGNOSIS — Z00.00 WELCOME TO MEDICARE PREVENTIVE VISIT: Primary | ICD-10-CM

## 2019-07-30 LAB
CREATININE URINE POCT: 373.2
HBA1C MFR BLD: 5.9 %
MICROALBUMIN/CREAT 24H UR: 45.8 MG/G{CREAT}
MICROALBUMIN/CREAT UR-RTO: 12.3

## 2019-07-30 PROCEDURE — 83036 HEMOGLOBIN GLYCOSYLATED A1C: CPT | Performed by: FAMILY MEDICINE

## 2019-07-30 PROCEDURE — G0438 PPPS, INITIAL VISIT: HCPCS | Performed by: FAMILY MEDICINE

## 2019-07-30 PROCEDURE — 82044 UR ALBUMIN SEMIQUANTITATIVE: CPT | Performed by: FAMILY MEDICINE

## 2019-07-30 RX ORDER — LOSARTAN POTASSIUM 50 MG/1
50 TABLET ORAL DAILY
Qty: 90 TABLET | Refills: 1 | Status: SHIPPED | OUTPATIENT
Start: 2019-07-30 | End: 2020-02-20 | Stop reason: SDUPTHER

## 2019-07-30 ASSESSMENT — PATIENT HEALTH QUESTIONNAIRE - PHQ9
SUM OF ALL RESPONSES TO PHQ QUESTIONS 1-9: 0
SUM OF ALL RESPONSES TO PHQ QUESTIONS 1-9: 0

## 2019-07-30 ASSESSMENT — LIFESTYLE VARIABLES: HOW OFTEN DO YOU HAVE A DRINK CONTAINING ALCOHOL: 0

## 2019-07-30 NOTE — PROGRESS NOTES
reviewed today and where indicated follow up appointments were made and/or referrals ordered. Positive Risk Factor Screenings with Interventions:     General Health:  General  In general, how would you say your health is?: Very Good  In the past 7 days, have you experienced any of the following? New or Increased Pain, New or Increased Fatigue, Loneliness, Social Isolation, Stress or Anger?: (!) New or Increased Pain  Do you get the social and emotional support that you need?: Yes  Do you have a Living Will?: (!) No  General Health Risk Interventions:  · No Living Will: provided the state-specific advance directive document to the patient    Health Habits/Nutrition:  Health Habits/Nutrition  Do you exercise for at least 20 minutes 2-3 times per week?: Yes  Have you lost any weight without trying in the past 3 months?: No  Do you eat fewer than 2 meals per day?: No  Have you seen a dentist within the past year?: Yes  Body mass index is 31.12 kg/m².   Health Habits/Nutrition Interventions:  · Inadequate physical activity:  patient is not ready to increase his/her physical activity level at this time, knee pain bother him once in a while    Safety:  Safety  Do you have working smoke detectors?: Yes  Have all throw rugs been removed or fastened?: (!) No  Do you have non-slip mats or surfaces in all bathtubs/showers?: (!) No  Do all of your stairways have a railing or banister?: Yes  Are your doorways, halls and stairs free of clutter?: Yes  Do you always fasten your seatbelt when you are in a car?: Yes  Safety Interventions:  · Patient declines any further evaluation/treatment for this issue    Personalized Preventive Plan   Current Health Maintenance Status  Immunization History   Administered Date(s) Administered    Influenza, High Dose (Fluzone 65 yrs and older) 11/15/2013, 09/06/2017    Influenza, Johnita Catching, Recombinant, IM PF (Flublok 18 yrs and older) 01/29/2019    Pneumococcal Conjugate 13-valent ([place] [place])

## 2019-08-07 RX ORDER — ATENOLOL 50 MG/1
50 TABLET ORAL DAILY
Qty: 90 TABLET | Refills: 1 | Status: SHIPPED | OUTPATIENT
Start: 2019-08-07 | End: 2020-02-20 | Stop reason: SDUPTHER

## 2019-08-15 ENCOUNTER — OFFICE VISIT (OUTPATIENT)
Dept: ORTHOPEDIC SURGERY | Age: 74
End: 2019-08-15
Payer: COMMERCIAL

## 2019-08-15 VITALS — WEIGHT: 185 LBS | TEMPERATURE: 97.4 F | HEIGHT: 65 IN | BODY MASS INDEX: 30.82 KG/M2

## 2019-08-15 DIAGNOSIS — M17.0 PRIMARY OSTEOARTHRITIS OF BOTH KNEES: Primary | ICD-10-CM

## 2019-08-15 PROCEDURE — 99213 OFFICE O/P EST LOW 20 MIN: CPT | Performed by: PHYSICIAN ASSISTANT

## 2019-08-15 PROCEDURE — 20610 DRAIN/INJ JOINT/BURSA W/O US: CPT | Performed by: PHYSICIAN ASSISTANT

## 2019-08-15 NOTE — PROGRESS NOTES
dr  Temperature 97.4 °F (36.3 °C), height 5' 4.75\" (1.645 m), weight 185 lb (83.9 kg). This dictation was done with Dragon dictation and may contain mechanical errors related to translation. This is a pleasant 71-year-old gentleman seen for osteoarthritis of both of his knees earlier this year. After cortisone injection very little to no pain in his left knee has 6 out of 10 pain mostly in the posterior aspect. It gets aggravated with cutting grass for prolonged walking. This dictation was done with Dragon dictation and may contain mechanical errors related to translation. Subjective:  left knee pain. Nubia Garza is here for  1st Eufflexxa injection for the  left knee(s). Objective:   Temperature 97.4 °F (36.3 °C), height 5' 4.75\" (1.645 m), weight 185 lb (83.9 kg). There is a milld joint effusion noted of the left knee(s). There is moderate pain with range of motion testing. There is no significant  instability noted. Neuro exam grossly intact both lower extremities. Intact sensation to light touch. Motor exam 4+ to 5/5 in all major motor groups. Negative Mclaughlin's sign. Skin is warm, dry and intact with out erythema or significant increased temperature around the knee joint(s). Assessment:  Degenerative Joint Disease of the left Knee(s). Plan:  Discussed  injections including all  risks and benefits including increased pain, drug reaction, infection, bleeding, lack of improvement and  neurovascular injury. All the questions were answered. PROCEDURE NOTE:  PRE-PROCEDURE DIAGNOSIS: DJD knee  POST-PROCEDURE DIAGNOSIS: DJD knee    With the patient's permission, his left knee was prepped in standard sterile fashion with  Alcohol. The prefilled injection of the preferred Eufflexxa was injected into the left lateral joint space compartment without difficulty. The patient tolerated the procedure(s) well without difficulty. A band-aid was applied.      POST-PROCEDURE

## 2019-08-22 ENCOUNTER — OFFICE VISIT (OUTPATIENT)
Dept: ORTHOPEDIC SURGERY | Age: 74
End: 2019-08-22
Payer: COMMERCIAL

## 2019-08-22 VITALS — TEMPERATURE: 95.9 F

## 2019-08-22 DIAGNOSIS — M17.0 PRIMARY OSTEOARTHRITIS OF BOTH KNEES: Primary | ICD-10-CM

## 2019-08-22 PROCEDURE — 20610 DRAIN/INJ JOINT/BURSA W/O US: CPT | Performed by: PHYSICIAN ASSISTANT

## 2019-08-22 NOTE — PROGRESS NOTES
This dictation was done with [employer] dictation and may contain mechanical errors related to translation. Subjective:  left knee pain. Jose Mccracken is here for  2nd Eufflexxa injection for the  left knee(s). Objective:   Temperature 95.9 °F (35.5 °C), temperature source Temporal.    There is a milld joint effusion noted of the left knee(s). There is moderate pain with range of motion testing. There is no significant  instability noted. Neuro exam grossly intact both lower extremities. Intact sensation to light touch. Motor exam 4+ to 5/5 in all major motor groups. Negative Mclaughlin's sign. Skin is warm, dry and intact with out erythema or significant increased temperature around the knee joint(s). Assessment:  Degenerative Joint Disease of the left Knee(s). Plan:  Discussed  injections including all  risks and benefits including increased pain, drug reaction, infection, bleeding, lack of improvement and  neurovascular injury. All the questions were answered. PROCEDURE NOTE:  PRE-PROCEDURE DIAGNOSIS: DJD knee  POST-PROCEDURE DIAGNOSIS: DJD knee    With the patient's permission, his left knee was prepped in standard sterile fashion with  Alcohol. The prefilled injection of the preferred Eufflexxa was injected into the left lateral joint space compartment without difficulty. The patient tolerated the procedure(s) well without difficulty. A band-aid was applied. POST-PROCEDURE INSTRUCTIONS GIVEN TO PATIENT: The patient was advised to ice the knee for 15-20 minutes to relieve any injection site related pain. Decrease activity for the next 24 to 48 hours. FOLLOW-UP:   As directed or call or return to clinic if these symptoms worsen or fail to improve as anticipated. If at any time you are concerned you may contact the office for further instructions or care.

## 2019-08-27 RX ORDER — FENOFIBRATE 145 MG/1
145 TABLET, COATED ORAL DAILY
Qty: 90 TABLET | Refills: 1 | Status: SHIPPED | OUTPATIENT
Start: 2019-08-27 | End: 2020-03-12 | Stop reason: SDUPTHER

## 2019-08-29 ENCOUNTER — OFFICE VISIT (OUTPATIENT)
Dept: ORTHOPEDIC SURGERY | Age: 74
End: 2019-08-29
Payer: COMMERCIAL

## 2019-08-29 VITALS — TEMPERATURE: 97.2 F

## 2019-08-29 DIAGNOSIS — M17.0 PRIMARY OSTEOARTHRITIS OF BOTH KNEES: Primary | ICD-10-CM

## 2019-08-29 PROCEDURE — 99024 POSTOP FOLLOW-UP VISIT: CPT | Performed by: PHYSICIAN ASSISTANT

## 2019-08-29 PROCEDURE — 20610 DRAIN/INJ JOINT/BURSA W/O US: CPT | Performed by: PHYSICIAN ASSISTANT

## 2019-10-09 RX ORDER — ATORVASTATIN CALCIUM 40 MG/1
40 TABLET, FILM COATED ORAL DAILY
Qty: 90 TABLET | Refills: 1 | Status: SHIPPED | OUTPATIENT
Start: 2019-10-09 | End: 2020-04-13

## 2019-10-29 ENCOUNTER — OFFICE VISIT (OUTPATIENT)
Dept: INTERNAL MEDICINE CLINIC | Age: 74
End: 2019-10-29
Payer: COMMERCIAL

## 2019-10-29 VITALS
SYSTOLIC BLOOD PRESSURE: 138 MMHG | HEART RATE: 56 BPM | RESPIRATION RATE: 18 BRPM | BODY MASS INDEX: 31.36 KG/M2 | WEIGHT: 187 LBS | DIASTOLIC BLOOD PRESSURE: 66 MMHG

## 2019-10-29 DIAGNOSIS — Z23 NEED FOR INFLUENZA VACCINATION: ICD-10-CM

## 2019-10-29 DIAGNOSIS — E11.9 TYPE 2 DIABETES MELLITUS WITHOUT COMPLICATION, WITHOUT LONG-TERM CURRENT USE OF INSULIN (HCC): Primary | ICD-10-CM

## 2019-10-29 DIAGNOSIS — M17.0 PRIMARY OSTEOARTHRITIS OF BOTH KNEES: ICD-10-CM

## 2019-10-29 DIAGNOSIS — E78.2 MIXED HYPERLIPIDEMIA: ICD-10-CM

## 2019-10-29 DIAGNOSIS — I10 ESSENTIAL HYPERTENSION: ICD-10-CM

## 2019-10-29 DIAGNOSIS — L91.8 SKIN TAG: ICD-10-CM

## 2019-10-29 LAB — HBA1C MFR BLD: 6.1 %

## 2019-10-29 PROCEDURE — G0008 ADMIN INFLUENZA VIRUS VAC: HCPCS | Performed by: FAMILY MEDICINE

## 2019-10-29 PROCEDURE — 99214 OFFICE O/P EST MOD 30 MIN: CPT | Performed by: FAMILY MEDICINE

## 2019-10-29 PROCEDURE — 90686 IIV4 VACC NO PRSV 0.5 ML IM: CPT | Performed by: FAMILY MEDICINE

## 2019-10-29 PROCEDURE — 11200 RMVL SKIN TAGS UP TO&INC 15: CPT | Performed by: FAMILY MEDICINE

## 2019-10-29 PROCEDURE — 83036 HEMOGLOBIN GLYCOSYLATED A1C: CPT | Performed by: FAMILY MEDICINE

## 2019-10-29 ASSESSMENT — ENCOUNTER SYMPTOMS
SHORTNESS OF BREATH: 0
ABDOMINAL PAIN: 0
BLOOD IN STOOL: 0
DIARRHEA: 0
TROUBLE SWALLOWING: 0
CONSTIPATION: 0
VOICE CHANGE: 0

## 2020-02-21 RX ORDER — LOSARTAN POTASSIUM 50 MG/1
50 TABLET ORAL DAILY
Qty: 90 TABLET | Refills: 0 | Status: SHIPPED | OUTPATIENT
Start: 2020-02-21 | End: 2020-05-26

## 2020-02-21 RX ORDER — ATENOLOL 50 MG/1
50 TABLET ORAL DAILY
Qty: 90 TABLET | Refills: 0 | Status: SHIPPED | OUTPATIENT
Start: 2020-02-21 | End: 2020-05-26

## 2020-03-12 RX ORDER — FENOFIBRATE 145 MG/1
145 TABLET, COATED ORAL DAILY
Qty: 90 TABLET | Refills: 1 | Status: SHIPPED | OUTPATIENT
Start: 2020-03-12 | End: 2020-09-08

## 2020-04-10 RX ORDER — ATORVASTATIN CALCIUM 40 MG/1
TABLET, FILM COATED ORAL
Qty: 90 TABLET | Refills: 0 | OUTPATIENT
Start: 2020-04-10

## 2020-04-13 RX ORDER — ATORVASTATIN CALCIUM 40 MG/1
40 TABLET, FILM COATED ORAL DAILY
Qty: 90 TABLET | Refills: 0 | Status: SHIPPED | OUTPATIENT
Start: 2020-04-13 | End: 2020-12-04

## 2020-04-15 ENCOUNTER — TELEPHONE (OUTPATIENT)
Dept: INTERNAL MEDICINE CLINIC | Age: 75
End: 2020-04-15

## 2020-04-16 ENCOUNTER — TELEPHONE (OUTPATIENT)
Dept: INTERNAL MEDICINE CLINIC | Age: 75
End: 2020-04-16

## 2020-04-16 NOTE — TELEPHONE ENCOUNTER
Patient called in and is looking for someone to give her a call back in regards to the appt set up for 4/28. Patient wants to cancel and reschedule and set up in office appt once everything clears up. Offered to assist, pt refused. Please advise.

## 2020-05-26 RX ORDER — ATENOLOL 50 MG/1
50 TABLET ORAL DAILY
Qty: 90 TABLET | Refills: 0 | Status: SHIPPED | OUTPATIENT
Start: 2020-05-26 | End: 2020-08-21

## 2020-05-26 RX ORDER — LOSARTAN POTASSIUM 50 MG/1
50 TABLET ORAL DAILY
Qty: 90 TABLET | Refills: 0 | Status: SHIPPED | OUTPATIENT
Start: 2020-05-26 | End: 2020-08-21

## 2020-07-10 ENCOUNTER — OFFICE VISIT (OUTPATIENT)
Dept: INTERNAL MEDICINE CLINIC | Age: 75
End: 2020-07-10
Payer: COMMERCIAL

## 2020-07-10 VITALS
WEIGHT: 186 LBS | DIASTOLIC BLOOD PRESSURE: 70 MMHG | SYSTOLIC BLOOD PRESSURE: 122 MMHG | BODY MASS INDEX: 30.99 KG/M2 | HEIGHT: 65 IN | HEART RATE: 58 BPM | OXYGEN SATURATION: 97 % | RESPIRATION RATE: 18 BRPM | TEMPERATURE: 97.9 F

## 2020-07-10 LAB
A/G RATIO: 1.9 (ref 1.1–2.2)
ALBUMIN SERPL-MCNC: 4.5 G/DL (ref 3.4–5)
ALP BLD-CCNC: 38 U/L (ref 40–129)
ALT SERPL-CCNC: 18 U/L (ref 10–40)
ANION GAP SERPL CALCULATED.3IONS-SCNC: 15 MMOL/L (ref 3–16)
AST SERPL-CCNC: 22 U/L (ref 15–37)
BASOPHILS ABSOLUTE: 0.1 K/UL (ref 0–0.2)
BASOPHILS RELATIVE PERCENT: 1.3 %
BILIRUB SERPL-MCNC: 0.8 MG/DL (ref 0–1)
BUN BLDV-MCNC: 22 MG/DL (ref 7–20)
CALCIUM SERPL-MCNC: 9.9 MG/DL (ref 8.3–10.6)
CHLORIDE BLD-SCNC: 103 MMOL/L (ref 99–110)
CHOLESTEROL, FASTING: 117 MG/DL (ref 0–199)
CO2: 22 MMOL/L (ref 21–32)
CREAT SERPL-MCNC: 1.3 MG/DL (ref 0.8–1.3)
CREATININE URINE POCT: 215.3
EOSINOPHILS ABSOLUTE: 0.2 K/UL (ref 0–0.6)
EOSINOPHILS RELATIVE PERCENT: 3.1 %
GFR AFRICAN AMERICAN: >60
GFR NON-AFRICAN AMERICAN: 54
GLOBULIN: 2.4 G/DL
GLUCOSE FASTING: 117 MG/DL (ref 70–99)
HBA1C MFR BLD: 6.2 %
HCT VFR BLD CALC: 46.9 % (ref 40.5–52.5)
HDLC SERPL-MCNC: 47 MG/DL (ref 40–60)
HEMOGLOBIN: 15.5 G/DL (ref 13.5–17.5)
LDL CHOLESTEROL CALCULATED: 57 MG/DL
LYMPHOCYTES ABSOLUTE: 1.7 K/UL (ref 1–5.1)
LYMPHOCYTES RELATIVE PERCENT: 28.1 %
MCH RBC QN AUTO: 30.6 PG (ref 26–34)
MCHC RBC AUTO-ENTMCNC: 33.2 G/DL (ref 31–36)
MCV RBC AUTO: 92.2 FL (ref 80–100)
MICROALBUMIN/CREAT 24H UR: 19.3 MG/G{CREAT}
MICROALBUMIN/CREAT UR-RTO: 9
MONOCYTES ABSOLUTE: 0.6 K/UL (ref 0–1.3)
MONOCYTES RELATIVE PERCENT: 10 %
NEUTROPHILS ABSOLUTE: 3.4 K/UL (ref 1.7–7.7)
NEUTROPHILS RELATIVE PERCENT: 57.5 %
PDW BLD-RTO: 14 % (ref 12.4–15.4)
PLATELET # BLD: 288 K/UL (ref 135–450)
PMV BLD AUTO: 8.4 FL (ref 5–10.5)
POTASSIUM SERPL-SCNC: 4 MMOL/L (ref 3.5–5.1)
PROSTATE SPECIFIC ANTIGEN: 1.28 NG/ML (ref 0–4)
RBC # BLD: 5.08 M/UL (ref 4.2–5.9)
SODIUM BLD-SCNC: 140 MMOL/L (ref 136–145)
T4 FREE: 1.6 NG/DL (ref 0.9–1.8)
TOTAL PROTEIN: 6.9 G/DL (ref 6.4–8.2)
TRIGLYCERIDE, FASTING: 67 MG/DL (ref 0–150)
TSH SERPL DL<=0.05 MIU/L-ACNC: 4.35 UIU/ML (ref 0.27–4.2)
VLDLC SERPL CALC-MCNC: 13 MG/DL
WBC # BLD: 6 K/UL (ref 4–11)

## 2020-07-10 PROCEDURE — 83036 HEMOGLOBIN GLYCOSYLATED A1C: CPT | Performed by: FAMILY MEDICINE

## 2020-07-10 PROCEDURE — 36415 COLL VENOUS BLD VENIPUNCTURE: CPT | Performed by: FAMILY MEDICINE

## 2020-07-10 PROCEDURE — 99214 OFFICE O/P EST MOD 30 MIN: CPT | Performed by: FAMILY MEDICINE

## 2020-07-10 PROCEDURE — 82044 UR ALBUMIN SEMIQUANTITATIVE: CPT | Performed by: FAMILY MEDICINE

## 2020-07-10 ASSESSMENT — ENCOUNTER SYMPTOMS
CONSTIPATION: 0
VOICE CHANGE: 0
SHORTNESS OF BREATH: 0
DIARRHEA: 0
BLOOD IN STOOL: 0
TROUBLE SWALLOWING: 0
ABDOMINAL PAIN: 0

## 2020-07-10 ASSESSMENT — PATIENT HEALTH QUESTIONNAIRE - PHQ9
SUM OF ALL RESPONSES TO PHQ QUESTIONS 1-9: 0
SUM OF ALL RESPONSES TO PHQ9 QUESTIONS 1 & 2: 0
SUM OF ALL RESPONSES TO PHQ QUESTIONS 1-9: 0
2. FEELING DOWN, DEPRESSED OR HOPELESS: 0
1. LITTLE INTEREST OR PLEASURE IN DOING THINGS: 0

## 2020-07-10 NOTE — PROGRESS NOTES
.  He takes metformin 1000 mg twice daily but most of the time he only take once a day. 7/10/2020   He denies hypoglycemic episode. Amanda Adam (:  1945) is a 77 y.o. male, here for evaluation of the following medical concerns:    HPI    Patient has diabetes controlled with current medication. He takes metformin 1000 mg twice daily but most of the time he only take once a day. He denies hypoglycemic episode. He has hypertension compliant with medication. He takes losartan and atenolol. He has hyperlipidemia tolerating statin. Takes Lipitor 40 mg daily. He has subclinical hypothyroidism not on any medication. Review of Systems   Constitutional: Negative for activity change. HENT: Negative for trouble swallowing and voice change. Eyes: Negative for visual disturbance. Respiratory: Negative for shortness of breath. Cardiovascular: Negative for chest pain and leg swelling. Gastrointestinal: Negative for abdominal pain, blood in stool, constipation and diarrhea. Genitourinary: Negative for difficulty urinating, dysuria, frequency, hematuria and scrotal swelling. Musculoskeletal: Negative for arthralgias and myalgias. Skin: Negative for rash. Neurological: Negative for dizziness. Psychiatric/Behavioral: Negative for behavioral problems. Prior to Visit Medications    Medication Sig Taking?  Authorizing Provider   zoster recombinant adjuvanted vaccine (SHINGRIX) 50 MCG/0.5ML SUSR injection Inject 0.5 mLs into the muscle once for 1 dose Yes Alex Mcclain MD   losartan (COZAAR) 50 MG tablet Take 1 tablet by mouth daily Yes Alex Mcclain MD   atenolol (TENORMIN) 50 MG tablet Take 1 tablet by mouth daily Yes Alex Mcclain MD   atorvastatin (LIPITOR) 40 MG tablet Take 1 tablet by mouth daily Yes Alex Mcclain MD   metFORMIN (GLUCOPHAGE) 1000 MG tablet Take 1 tablet by mouth 2 times daily (with meals) Yes Alex Mcclain MD   fenofibrate (TRICOR) 145 MG tablet Take 1 tablet by mouth daily Yes Larry Guevara MD   aspirin 81 MG tablet Take 81 mg by mouth daily. Yes Historical Provider, MD        Social History     Tobacco Use    Smoking status: Never Smoker    Smokeless tobacco: Never Used   Substance Use Topics    Alcohol use: No        Vitals:    07/10/20 0905   BP: 122/70   Pulse: 58   Resp: 18   Temp: 97.9 °F (36.6 °C)   TempSrc: Temporal   SpO2: 97%   Weight: 186 lb (84.4 kg)   Height: 5' 5\" (1.651 m)     Estimated body mass index is 30.95 kg/m² as calculated from the following:    Height as of this encounter: 5' 5\" (1.651 m). Weight as of this encounter: 186 lb (84.4 kg). Physical Exam  Constitutional:       General: He is not in acute distress. Appearance: He is well-developed. HENT:      Head: Normocephalic. Eyes:      Conjunctiva/sclera: Conjunctivae normal.   Neck:      Musculoskeletal: Neck supple. Thyroid: No thyromegaly. Cardiovascular:      Rate and Rhythm: Normal rate and regular rhythm. Heart sounds: Normal heart sounds. No murmur. Pulmonary:      Effort: No respiratory distress. Breath sounds: Normal breath sounds. No wheezing or rales. Abdominal:      General: There is no distension. Palpations: Abdomen is soft. Musculoskeletal: Normal range of motion. Skin:     General: Skin is warm. Findings: No rash. Neurological:      Mental Status: He is alert and oriented to person, place, and time. Psychiatric:         Behavior: Behavior normal.         ASSESSMENT/PLAN:  1. Type 2 diabetes mellitus without complication, without long-term current use of insulin (HCC)  Controlled. HbA1c today 6.2%. Continue metformin 1000 mg twice daily. - Comprehensive Metabolic Panel, Fasting  - POCT glycosylated hemoglobin (Hb A1C)  - POCT microalbumin    2. Essential hypertension  Controlled. Continue losartan 50 mg daily and atenolol 50 mg daily.  - CBC Auto Differential    3. Mixed hyperlipidemia  Controlled.   Continue Lipitor 40 mg daily. Will check lipid panel today. - T4, Free  - TSH without Reflex  - Lipid, Fasting    4. Subclinical hypothyroidism  Will check thyroid function test today    5. Screening PSA (prostate specific antigen)  - Psa screening    6. Nocturia 3-4 x /night  Advised to reduce fluid intake at night. If not improved will consider adding Flomax next visit    RTC in 6 mos.     An electronic signature was used to authenticate this note.    --Sunshine Mascorro MD on 7/10/2020 at 10:11 AM

## 2020-08-21 RX ORDER — LOSARTAN POTASSIUM 50 MG/1
50 TABLET ORAL DAILY
Qty: 90 TABLET | Refills: 1 | Status: SHIPPED | OUTPATIENT
Start: 2020-08-21 | End: 2021-02-19 | Stop reason: SDUPTHER

## 2020-08-21 RX ORDER — ATENOLOL 50 MG/1
50 TABLET ORAL DAILY
Qty: 90 TABLET | Refills: 1 | Status: SHIPPED | OUTPATIENT
Start: 2020-08-21 | End: 2021-02-19 | Stop reason: SDUPTHER

## 2020-09-08 RX ORDER — FENOFIBRATE 145 MG/1
145 TABLET, COATED ORAL DAILY
Qty: 90 TABLET | Refills: 1 | Status: SHIPPED | OUTPATIENT
Start: 2020-09-08 | End: 2021-03-08

## 2020-12-04 RX ORDER — ATORVASTATIN CALCIUM 40 MG/1
40 TABLET, FILM COATED ORAL DAILY
Qty: 90 TABLET | Refills: 1 | Status: SHIPPED | OUTPATIENT
Start: 2020-12-04 | End: 2021-02-19 | Stop reason: SDUPTHER

## 2020-12-28 ENCOUNTER — OFFICE VISIT (OUTPATIENT)
Dept: ORTHOPEDIC SURGERY | Age: 75
End: 2020-12-28
Payer: COMMERCIAL

## 2020-12-28 VITALS — HEIGHT: 65 IN | TEMPERATURE: 97.2 F | WEIGHT: 186 LBS | BODY MASS INDEX: 30.99 KG/M2 | RESPIRATION RATE: 16 BRPM

## 2020-12-28 PROCEDURE — 99213 OFFICE O/P EST LOW 20 MIN: CPT | Performed by: PHYSICIAN ASSISTANT

## 2020-12-28 PROCEDURE — 20610 DRAIN/INJ JOINT/BURSA W/O US: CPT | Performed by: PHYSICIAN ASSISTANT

## 2020-12-28 NOTE — PROGRESS NOTES
This dictation was done with Vitals ([email or web address])on dictation and may contain mechanical errors related to translation. Temperature 97.2 °F (36.2 °C), resp. rate 16, height 5' 5\" (1.651 m), weight 186 lb (84.4 kg). This is a pleasant 79-year-old gentleman who has ongoing pain from osteoarthritis in his knees. He had close to full relief after finishing the cortisone injection previously. The most recent Euflexxa injections on 8/29/2019 did not up feel caused him as much benefit and over the last 2 to 3 weeks he has had some increasing pain to where now he has a 5 out of 10 pain in both of his knees with crepitus during flexion and extension and swelling across the medial joint line. This patient is here in follow-up for ongoing pain and dysfunction in their knees. There x-rays done previously showed joint space narrowing subchondral sclerosis with osteophyte formation. They currently have had relief with injections of cortisone, anti-inflammatories and a home exercise program. There are here with increased pain over the last few days with swelling and dysfunction. On examination this is a well-developed patient in no acute distress. They are alert and oriented ×3. They walk without antalgia or any significant varus or valgus deformity. They have crepitus during flexion and extension in the patellofemoral joint. They have a negative Lachman and a negative Contreras. There are good distal pulses and good dorsiflexion and plantarflexion strength present    My impression is bilateral knee osteoarthritis    After a discussion of the multiple options, they consented to a cortisone shot. 1 ml of 40mg/ml Kenalog and 2 ml's of 0.25%Marcaine were injected into both the right and the left knee joint spaces. The leg was slightly flexed and injected just lateral to the patella tendon to under the patella. This was done with sterile technique and they tolerated it well.

## 2021-02-19 ENCOUNTER — OFFICE VISIT (OUTPATIENT)
Dept: PRIMARY CARE CLINIC | Age: 76
End: 2021-02-19
Payer: COMMERCIAL

## 2021-02-19 VITALS
RESPIRATION RATE: 18 BRPM | BODY MASS INDEX: 31.28 KG/M2 | HEART RATE: 46 BPM | SYSTOLIC BLOOD PRESSURE: 137 MMHG | WEIGHT: 188 LBS | DIASTOLIC BLOOD PRESSURE: 74 MMHG | TEMPERATURE: 97.2 F | OXYGEN SATURATION: 98 %

## 2021-02-19 DIAGNOSIS — I10 ESSENTIAL HYPERTENSION: Primary | ICD-10-CM

## 2021-02-19 DIAGNOSIS — E78.2 MIXED HYPERLIPIDEMIA: ICD-10-CM

## 2021-02-19 DIAGNOSIS — M17.0 PRIMARY OSTEOARTHRITIS OF BOTH KNEES: ICD-10-CM

## 2021-02-19 DIAGNOSIS — E11.9 TYPE 2 DIABETES MELLITUS WITHOUT COMPLICATION, WITHOUT LONG-TERM CURRENT USE OF INSULIN (HCC): ICD-10-CM

## 2021-02-19 DIAGNOSIS — E03.8 SUBCLINICAL HYPOTHYROIDISM: ICD-10-CM

## 2021-02-19 PROBLEM — K35.30 ACUTE APPENDICITIS WITH LOCALIZED PERITONITIS: Status: RESOLVED | Noted: 2017-07-14 | Resolved: 2021-02-19

## 2021-02-19 LAB — HBA1C MFR BLD: 6.1 %

## 2021-02-19 PROCEDURE — 83036 HEMOGLOBIN GLYCOSYLATED A1C: CPT | Performed by: FAMILY MEDICINE

## 2021-02-19 PROCEDURE — 99214 OFFICE O/P EST MOD 30 MIN: CPT | Performed by: FAMILY MEDICINE

## 2021-02-19 RX ORDER — LOSARTAN POTASSIUM 50 MG/1
50 TABLET ORAL DAILY
Qty: 90 TABLET | Refills: 1 | Status: SHIPPED | OUTPATIENT
Start: 2021-02-19 | End: 2021-08-18

## 2021-02-19 RX ORDER — ATENOLOL 50 MG/1
50 TABLET ORAL DAILY
Qty: 90 TABLET | Refills: 1 | Status: SHIPPED | OUTPATIENT
Start: 2021-02-19 | End: 2021-08-18

## 2021-02-19 RX ORDER — ATORVASTATIN CALCIUM 40 MG/1
40 TABLET, FILM COATED ORAL DAILY
Qty: 90 TABLET | Refills: 1 | Status: SHIPPED | OUTPATIENT
Start: 2021-02-19 | End: 2021-08-18

## 2021-02-19 SDOH — ECONOMIC STABILITY: TRANSPORTATION INSECURITY
IN THE PAST 12 MONTHS, HAS LACK OF TRANSPORTATION KEPT YOU FROM MEETINGS, WORK, OR FROM GETTING THINGS NEEDED FOR DAILY LIVING?: NO

## 2021-02-19 SDOH — ECONOMIC STABILITY: TRANSPORTATION INSECURITY
IN THE PAST 12 MONTHS, HAS THE LACK OF TRANSPORTATION KEPT YOU FROM MEDICAL APPOINTMENTS OR FROM GETTING MEDICATIONS?: NO

## 2021-02-19 SDOH — ECONOMIC STABILITY: INCOME INSECURITY: HOW HARD IS IT FOR YOU TO PAY FOR THE VERY BASICS LIKE FOOD, HOUSING, MEDICAL CARE, AND HEATING?: NOT HARD AT ALL

## 2021-02-19 ASSESSMENT — ENCOUNTER SYMPTOMS
CONSTIPATION: 0
DIARRHEA: 0
TROUBLE SWALLOWING: 0
VOICE CHANGE: 0
ABDOMINAL PAIN: 0
SHORTNESS OF BREATH: 0
BLOOD IN STOOL: 0

## 2021-02-19 ASSESSMENT — PATIENT HEALTH QUESTIONNAIRE - PHQ9
SUM OF ALL RESPONSES TO PHQ9 QUESTIONS 1 & 2: 0
SUM OF ALL RESPONSES TO PHQ QUESTIONS 1-9: 0
2. FEELING DOWN, DEPRESSED OR HOPELESS: 0

## 2021-02-19 NOTE — PROGRESS NOTES
2021     Kj Martin (:  1945) is a 77 y.o. male, here for evaluation of the following medical concerns:    HPI  Patient presented to the office for regular follow-up. He has hypertension controlled with losartan and atenolol. He has diabetes controlled with Metformin 1000 mg once a day. He reported that taking Metformin twice a day gave him diarrhea. He has hyperlipidemia and takes Lipitor and TriCor. He is tolerating statin. He has knee pain due to arthritis and has cortisone shot which he reported to help relieve the symptoms. He is not ready for surgical treatment at this time. Review of Systems   Constitutional: Negative for activity change. HENT: Negative for trouble swallowing and voice change. Eyes: Negative for visual disturbance. Respiratory: Negative for shortness of breath. Cardiovascular: Negative for chest pain and leg swelling. Gastrointestinal: Negative for abdominal pain, blood in stool, constipation and diarrhea. Genitourinary: Negative for difficulty urinating, dysuria, frequency, hematuria and scrotal swelling. Musculoskeletal: Negative for arthralgias and myalgias. Skin: Negative for rash. Neurological: Negative for dizziness. Psychiatric/Behavioral: Negative for behavioral problems. Prior to Visit Medications    Medication Sig Taking?  Authorizing Provider   losartan (COZAAR) 50 MG tablet Take 1 tablet by mouth daily Yes Jagjit Babcock MD   atenolol (TENORMIN) 50 MG tablet Take 1 tablet by mouth daily Yes Jagjit Babcock MD   zoster recombinant adjuvanted vaccine (SHINGRIX) 50 MCG/0.5ML SUSR injection Inject 0.5 mLs into the muscle once for 1 dose Yes Jagjit Babcock MD   atorvastatin (LIPITOR) 40 MG tablet Take 1 tablet by mouth daily Yes Jagjit Babcock MD   fenofibrate (TRICOR) 145 MG tablet Take 1 tablet by mouth daily Yes Jagjit Babcock MD   metFORMIN (GLUCOPHAGE) 1000 MG tablet Take 1 tablet by mouth 2 times daily (with meals)  Arnold Brewer MD   aspirin 81 MG tablet Take 81 mg by mouth daily. Historical Provider, MD        Social History     Tobacco Use    Smoking status: Never Smoker    Smokeless tobacco: Never Used   Substance Use Topics    Alcohol use: No        Vitals:    02/19/21 0952   BP: 137/74   Pulse: (!) 46   Resp: 18   Temp: 97.2 °F (36.2 °C)   TempSrc: Temporal   SpO2: 98%   Weight: 188 lb (85.3 kg)     Estimated body mass index is 31.28 kg/m² as calculated from the following:    Height as of 12/28/20: 5' 5\" (1.651 m). Weight as of this encounter: 188 lb (85.3 kg). Physical Exam  Constitutional:       Appearance: He is well-developed. HENT:      Head: Normocephalic. Eyes:      Conjunctiva/sclera: Conjunctivae normal.      Pupils: Pupils are equal, round, and reactive to light. Neck:      Musculoskeletal: Normal range of motion and neck supple. Thyroid: No thyromegaly. Cardiovascular:      Rate and Rhythm: Normal rate and regular rhythm. Heart sounds: Normal heart sounds. No murmur. Pulmonary:      Effort: Pulmonary effort is normal.      Breath sounds: Normal breath sounds. Abdominal:      General: Bowel sounds are normal.      Palpations: Abdomen is soft. There is no mass. Genitourinary:     Penis: Normal.       Prostate: Normal.   Musculoskeletal: Normal range of motion. Skin:     General: Skin is warm. Findings: No rash. Neurological:      Mental Status: He is alert. Psychiatric:         Behavior: Behavior normal.         ASSESSMENT/PLAN:  1. Essential hypertension  Controlled. Continue losartan 50 mg daily and atenolol 50 mg daily. Watch for bradycardia and if is symptomatic will reduce the dose of atenolol next visit. 2. Type 2 diabetes mellitus without complication, without long-term current use of insulin (HCC)  Controlled. HbA1c today 6.1%. Continue Metformin 1000 mg daily.   She reported to have diarrhea with Metformin taken twice a day  - POCT glycosylated hemoglobin (Hb A1C)    3. Mixed hyperlipidemia  Controlled. Continue Lipitor 40 mg daily and TriCor 145 mg daily. Will check lipid panel next blood draw. 4. Primary osteoarthritis of both knees  Patient experienced improvement after cortisone shot in both knees. May take Tylenol as needed for pain. 5.  Patient reported nocturia 3 times at night. Suspect BPH. Advised to limit fluid and caffeine intake at night and if symptoms does not improve consider adding Flomax next visit. 6. Subclinical hypothyroidism. Will recheck thyroid function test next blood draw and consider thyroid replacement. RTC in 3 mos.  Blood test next visit    An electronic signature was used to authenticate this note.    --Chari Alvares MD on 2/19/2021 at 10:52 AM

## 2021-02-22 RX ORDER — LOSARTAN POTASSIUM 50 MG/1
TABLET ORAL
Qty: 90 TABLET | Refills: 0 | OUTPATIENT
Start: 2021-02-22

## 2021-03-08 RX ORDER — FENOFIBRATE 145 MG/1
TABLET, COATED ORAL
Qty: 90 TABLET | Refills: 1 | Status: SHIPPED | OUTPATIENT
Start: 2021-03-08 | End: 2021-09-07

## 2021-08-05 ENCOUNTER — OFFICE VISIT (OUTPATIENT)
Dept: PRIMARY CARE CLINIC | Age: 76
End: 2021-08-05
Payer: COMMERCIAL

## 2021-08-05 ENCOUNTER — TELEPHONE (OUTPATIENT)
Dept: PRIMARY CARE CLINIC | Age: 76
End: 2021-08-05

## 2021-08-05 VITALS
BODY MASS INDEX: 30.69 KG/M2 | DIASTOLIC BLOOD PRESSURE: 80 MMHG | SYSTOLIC BLOOD PRESSURE: 139 MMHG | RESPIRATION RATE: 18 BRPM | WEIGHT: 184.2 LBS | HEIGHT: 65 IN | HEART RATE: 61 BPM | OXYGEN SATURATION: 97 %

## 2021-08-05 DIAGNOSIS — E03.8 SUBCLINICAL HYPOTHYROIDISM: Primary | ICD-10-CM

## 2021-08-05 DIAGNOSIS — Z13.220 LIPID SCREENING: ICD-10-CM

## 2021-08-05 DIAGNOSIS — E11.9 TYPE 2 DIABETES MELLITUS WITHOUT COMPLICATION, WITHOUT LONG-TERM CURRENT USE OF INSULIN (HCC): ICD-10-CM

## 2021-08-05 DIAGNOSIS — I10 ESSENTIAL HYPERTENSION: ICD-10-CM

## 2021-08-05 DIAGNOSIS — I10 ESSENTIAL HYPERTENSION: Primary | ICD-10-CM

## 2021-08-05 DIAGNOSIS — Z12.5 SCREENING PSA (PROSTATE SPECIFIC ANTIGEN): ICD-10-CM

## 2021-08-05 DIAGNOSIS — E03.8 SUBCLINICAL HYPOTHYROIDISM: ICD-10-CM

## 2021-08-05 DIAGNOSIS — Z00.00 ROUTINE GENERAL MEDICAL EXAMINATION AT A HEALTH CARE FACILITY: ICD-10-CM

## 2021-08-05 LAB — HBA1C MFR BLD: 6 %

## 2021-08-05 PROCEDURE — 83036 HEMOGLOBIN GLYCOSYLATED A1C: CPT | Performed by: FAMILY MEDICINE

## 2021-08-05 PROCEDURE — G0439 PPPS, SUBSEQ VISIT: HCPCS | Performed by: FAMILY MEDICINE

## 2021-08-05 PROCEDURE — 99212 OFFICE O/P EST SF 10 MIN: CPT | Performed by: FAMILY MEDICINE

## 2021-08-05 ASSESSMENT — PATIENT HEALTH QUESTIONNAIRE - PHQ9
1. LITTLE INTEREST OR PLEASURE IN DOING THINGS: 0
SUM OF ALL RESPONSES TO PHQ QUESTIONS 1-9: 0
2. FEELING DOWN, DEPRESSED OR HOPELESS: 0
SUM OF ALL RESPONSES TO PHQ9 QUESTIONS 1 & 2: 0
SUM OF ALL RESPONSES TO PHQ QUESTIONS 1-9: 0
SUM OF ALL RESPONSES TO PHQ QUESTIONS 1-9: 0

## 2021-08-05 ASSESSMENT — LIFESTYLE VARIABLES: HOW OFTEN DO YOU HAVE A DRINK CONTAINING ALCOHOL: 0

## 2021-08-05 NOTE — PATIENT INSTRUCTIONS
Personalized Preventive Plan for Diya Dutton - 8/5/2021  Medicare offers a range of preventive health benefits. Some of the tests and screenings are paid in full while other may be subject to a deductible, co-insurance, and/or copay. Some of these benefits include a comprehensive review of your medical history including lifestyle, illnesses that may run in your family, and various assessments and screenings as appropriate. After reviewing your medical record and screening and assessments performed today your provider may have ordered immunizations, labs, imaging, and/or referrals for you. A list of these orders (if applicable) as well as your Preventive Care list are included within your After Visit Summary for your review. Other Preventive Recommendations:    · A preventive eye exam performed by an eye specialist is recommended every 1-2 years to screen for glaucoma; cataracts, macular degeneration, and other eye disorders. · A preventive dental visit is recommended every 6 months. · Try to get at least 150 minutes of exercise per week or 10,000 steps per day on a pedometer . · Order or download the FREE \"Exercise & Physical Activity: Your Everyday Guide\" from [employer]. Call 3-885.106.1649 or search [employer] online. · You need 9108-8534 mg of calcium and 7234-0502 IU of vitamin D per day. It is possible to meet your calcium requirement with diet alone, but a vitamin D supplement is usually necessary to meet this goal.  · When exposed to the sun, use a sunscreen that protects against both UVA and UVB radiation with an SPF of 30 or greater. Reapply every 2 to 3 hours or after sweating, drying off with a towel, or swimming. · Always wear a seat belt when traveling in a car. Always wear a helmet when riding a bicycle or motorcycle.

## 2021-08-05 NOTE — LETTER
[facility]  [address] [id number]  Phone: [phone]  Fax: [phone]    Evelyn Cote MD                                                                                   August 5, 2021                       [place] [address] [facility],[address]      Dear Nya Contreras: Thank you for enrolling in [address] Please follow the instructions below to securely access your online medical record. [employer] allows you to send messages to your doctor, view your test results, renew your prescriptions, schedule appointments, and more. How Do I Sign Up? 1. In your Internet browser, go to [email or web address]  2. Click on the Sign Up Now link in the Sign In box. You will see the New Member Sign Up page. 3. Enter your [employer] Access Code exactly as it appears below. You will not need to use this code after youve completed the sign-up process. If you do not sign up before the expiration date, you must request a new code. [employer] Access Code: [email or web address]  Expires: 9/19/2021 10:36 AM    4. Enter your Social Security Number ([id number]) and Date of Birth (mm/dd/yyyy) as indicated and click Submit. You will be taken to the next sign-up page. 5. Create a [employer] ID. This will be your [employer] login ID and cannot be changed, so think of one that is secure and easy to remember. 6. Create a [employer] password. You can change your password at any time. 7. Enter your Password Reset Question and Answer. This can be used at a later time if you forget your password. 8. Enter your e-mail address. You will receive e-mail notification when new information is available in [address] 9. Click Sign Up. You can now view your medical record. Additional Information  If you have questions, please contact the physician practice where you receive care. Remember, [employer] is NOT to be used for urgent needs. For medical emergencies, dial 911.     For questions regarding your [employer] account call [phone]. If you have a clinical question, please call your doctor's office.     Sincerely,  Maryjane Cota MD

## 2021-08-05 NOTE — PROGRESS NOTES
Medicare Annual Wellness Visit  Name: Jessenia Huertas Date: 2021   MRN: [id number] Sex: Male   Age: 77 y.o. Ethnicity: Non- / Non    : 1945 Race: Eve Ivey is here for Medicare AWV    Screenings for behavioral, psychosocial and functional/safety risks, and cognitive dysfunction are all negative except as indicated below. These results, as well as other patient data from the [address] form, are documented in Flowsheets linked to this Encounter. Allergies   Allergen Reactions    Lisinopril      Ace cough         Prior to Visit Medications    Medication Sig Taking? Authorizing Provider   metFORMIN (GLUCOPHAGE) 1000 MG tablet Take 1 tablet by mouth 2 times daily (with meals)  Patient taking differently: Take 1,000 mg by mouth daily (with breakfast)  Yes Prashanth Carlson MD   fenofibrate (TRICOR) 145 MG tablet TAKE ONE TABLET BY MOUTH DAILY Yes Prashanth Carlson MD   losartan (COZAAR) 50 MG tablet Take 1 tablet by mouth daily Yes Prashanth Carlson MD   atenolol (TENORMIN) 50 MG tablet Take 1 tablet by mouth daily Yes Prashanth Carlson MD   atorvastatin (LIPITOR) 40 MG tablet Take 1 tablet by mouth daily Yes Prashanth Carlson MD   aspirin 81 MG tablet Take 81 mg by mouth daily.  Yes Historical Provider, MD         Past Medical History:   Diagnosis Date    Back pain 2012    Cataract     per Dr Radha Andrew note 17    HTN (hypertension) 2012    Hyperlipidemia 2012    Impaired fasting glucose 2012    Osteoarthritis 2012    Type 2 diabetes mellitus without complication ([facility] [address]        Past Surgical History:   Procedure Laterality Date    LAPAROSCOPIC APPENDECTOMY  2017    Laparoscopic appendectomy         Family History   Problem Relation Age of Onset    Stroke Father     Cancer Sister        CareTeam (Including outside providers/suppliers regularly involved in providing care):   Patient Care Team:  Olvin Castellon MD [facility] as PCP - General (Internal Medicine)  Jitendra Thao MD as PCP - [facility] Empaneled Provider  Kobi Maxwell MD as Consulting Physician (Ophthalmology)    Wt Readings from Last 3 Encounters:   08/05/21 184 lb 3.2 oz (83.6 kg)   02/19/21 188 lb (85.3 kg)   12/28/20 186 lb (84.4 kg)     Vitals:    08/05/21 1023   BP: 139/80   Pulse: 61   Resp: 18   SpO2: 97%   Weight: 184 lb 3.2 oz (83.6 kg)   Height: 5' 4.5\" (1.638 m)     Body mass index is 31.13 kg/m². Based upon direct observation of the patient, evaluation of cognition reveals recent and remote memory intact. General Appearance: alert and oriented to person, place and time, well developed and well- nourished, in no acute distress  Skin: warm and dry, no rash or erythema  Head: normocephalic and atraumatic  Eyes: pupils equal, round, and reactive to light, extraocular eye movements intact, conjunctivae normal  ENT: tympanic membrane, external ear and ear canal normal bilaterally, nose without deformity, nasal mucosa and turbinates normal without polyps  Neck: supple and non-tender without mass, no thyromegaly or thyroid nodules, no cervical lymphadenopathy  Pulmonary/Chest: clear to auscultation bilaterally- no wheezes, rales or rhonchi, normal air movement, no respiratory distress  Cardiovascular: normal rate, regular rhythm, normal S1 and S2, no murmurs, rubs, clicks, or gallops, distal pulses intact, no carotid bruits  Abdomen: soft, non-tender, non-distended, normal bowel sounds, no masses or organomegaly  Extremities: no cyanosis, clubbing or edema  Musculoskeletal: normal range of motion, no joint swelling, deformity or tenderness  Neurologic: reflexes normal and symmetric, no cranial nerve deficit, gait, coordination and speech normal    Patient's complete Health Risk Assessment and screening values have been reviewed and are found in Flowsheets.  The following problems were reviewed today and where indicated follow up appointments were made and/or referrals ordered. Positive Risk Factor Screenings with Interventions:            General Health and ACP:  General  In general, how would you say your health is?: Good  In the past 7 days, have you experienced any of the following? New or Increased Pain, New or Increased Fatigue, Loneliness, Social Isolation, Stress or Anger?: (!) New or Increased Pain (bilateral knee pain)  Do you get the social and emotional support that you need?: Yes  Do you have a Living Will?: (!) No  Advance Directives     Power of  Living Will ACP-Advance Directive ACP-Power of     Not on File Filed on 07/30/19 Filed Not on File      General Health Risk Interventions:  · No Living Will: Advance Care Planning addressed with patient today    Health Habits/Nutrition:  Health Habits/Nutrition  Do you exercise for at least 20 minutes 2-3 times per week?: Yes  Have you lost any weight without trying in the past 3 months?: No  Do you eat only one meal per day?: No  Have you seen the dentist within the past year?: Yes  Body mass index: (!) 31.13  Health Habits/Nutrition Interventions:  · .      Safety:  Safety  Do you have working smoke detectors?: Yes  Have all throw rugs been removed or fastened?: (!) No  Do you have non-slip mats or surfaces in all bathtubs/showers?: (!) No  Do all of your stairways have a railing or banister?: Yes  Are your doorways, halls and stairs free of clutter?: Yes  Do you always fasten your seatbelt when you are in a car?: Yes  Safety Interventions:  · Patient declines any further evaluation/treatment for this issue     Personalized Preventive Plan   Current Health Maintenance Status  Immunization History   Administered Date(s) Administered    Influenza, High Dose (Fluzone 65 yrs and older) 11/15/2013, 09/06/2017    Influenza, Quadv, IM, PF (6 mo and older Fluzone, Flulaval, Fluarix, and 3 yrs and older Afluria) 10/29/2019    Influenza, Quadv, Recombinant, IM PF (Flublok 18 yrs and older) 01/29/2019    Pneumococcal Conjugate 13-valent ([id number]) 03/27/2017    Pneumococcal Polysaccharide ([id number]) 11/15/2013    Tdap (Boostrix, Adacel) 04/03/2017        Health Maintenance   Topic Date Due    Shingles Vaccine (1 of 2) Never done   [employer] Visit (AWV)  Never done    Diabetic foot exam  10/29/2020    TSH testing  07/10/2021    Potassium monitoring  07/10/2021    Creatinine monitoring  07/10/2021    Lipid screen  07/10/2021    Flu vaccine (1) 09/01/2021    A1C test (Diabetic or Prediabetic)  02/19/2022    Diabetic retinal exam  10/21/2022    Colon cancer screen colonoscopy  06/01/2025    DTaP/Tdap/Td vaccine (2 - Td or Tdap) 04/03/2027    Pneumococcal 65+ years Vaccine  Completed    COVID-19 Vaccine  Completed    Hepatitis C screen  Completed    Hepatitis A vaccine  Aged Out    Hib vaccine  Aged Out    Meningococcal (ACWY) vaccine  Aged Out     Recommendations for [employer] Due: see orders and patient instructions/AVS.  . Recommended screening schedule for the next 5-10 years is provided to the patient in written form: see Patient Parvez Bullock was seen today for medicare awv. Diagnoses and all orders for this visit:    Subclinical hypothyroidism  Will recheck thyroid panel    Essential hypertension  Blood pressure is borderline high. If still elevated next visit will increase  Losartan 50 mg to 100 mg daily.  Continue Atenolol 50 mg daily    Routine general medical examination at a health care facility

## 2021-08-18 RX ORDER — LOSARTAN POTASSIUM 50 MG/1
50 TABLET ORAL DAILY
Qty: 90 TABLET | Refills: 1 | Status: SHIPPED | OUTPATIENT
Start: 2021-08-18 | End: 2022-02-10 | Stop reason: SDUPTHER

## 2021-08-18 RX ORDER — ATORVASTATIN CALCIUM 40 MG/1
40 TABLET, FILM COATED ORAL DAILY
Qty: 90 TABLET | Refills: 1 | Status: SHIPPED | OUTPATIENT
Start: 2021-08-18 | End: 2022-02-14

## 2021-08-18 RX ORDER — ATENOLOL 50 MG/1
50 TABLET ORAL DAILY
Qty: 90 TABLET | Refills: 1 | Status: SHIPPED | OUTPATIENT
Start: 2021-08-18 | End: 2022-02-10 | Stop reason: SDUPTHER

## 2021-09-08 RX ORDER — FENOFIBRATE 145 MG/1
145 TABLET, COATED ORAL DAILY
Qty: 90 TABLET | Refills: 1 | Status: SHIPPED | OUTPATIENT
Start: 2021-09-08 | End: 2022-03-14

## 2021-12-01 ENCOUNTER — OFFICE VISIT (OUTPATIENT)
Dept: PRIMARY CARE CLINIC | Age: 76
End: 2021-12-01
Payer: COMMERCIAL

## 2021-12-01 VITALS
OXYGEN SATURATION: 96 % | WEIGHT: 182.6 LBS | HEART RATE: 59 BPM | SYSTOLIC BLOOD PRESSURE: 155 MMHG | DIASTOLIC BLOOD PRESSURE: 84 MMHG | BODY MASS INDEX: 30.86 KG/M2 | RESPIRATION RATE: 16 BRPM

## 2021-12-01 DIAGNOSIS — E11.9 TYPE 2 DIABETES MELLITUS WITHOUT COMPLICATION, WITHOUT LONG-TERM CURRENT USE OF INSULIN (HCC): ICD-10-CM

## 2021-12-01 DIAGNOSIS — H25.013 CORTICAL AGE-RELATED CATARACT OF BOTH EYES: ICD-10-CM

## 2021-12-01 DIAGNOSIS — Z01.818 PREOP EXAMINATION: ICD-10-CM

## 2021-12-01 LAB — HBA1C MFR BLD: 5.8 %

## 2021-12-01 PROCEDURE — 83036 HEMOGLOBIN GLYCOSYLATED A1C: CPT | Performed by: FAMILY MEDICINE

## 2021-12-01 PROCEDURE — 99203 OFFICE O/P NEW LOW 30 MIN: CPT | Performed by: FAMILY MEDICINE

## 2021-12-01 NOTE — PROGRESS NOTES
Preoperative Consultation      Tawana Khan  YOB: 1945    Date of Service:  12/1/2021    Vitals:    12/01/21 1058 12/01/21 1117   BP: (!) 143/85 (!) 155/84   Pulse: 59    Resp: 16    SpO2: 96%    Weight: 182 lb 9.6 oz (82.8 kg)       Wt Readings from Last 2 Encounters:   12/01/21 182 lb 9.6 oz (82.8 kg)   08/05/21 184 lb 3.2 oz (83.6 kg)     BP Readings from Last 3 Encounters:   12/01/21 (!) 155/84   08/05/21 139/80   02/19/21 137/74        Chief Complaint   Patient presents with   Evgeny Lyn Pre-op Exam     cataract removal(s) with lens implant;  DOS: 12/9/21, 12/16/21; Dr Shelby Oglesby- fax:  [phone]     Allergies   Allergen Reactions    Lisinopril      Ace cough     Outpatient Medications Marked as Taking for the 12/1/21 encounter (Office Visit) with Thomas Quinn MD   Medication Sig Dispense Refill    fenofibrate (TRICOR) 145 MG tablet Take 1 tablet by mouth daily 90 tablet 1    metFORMIN (GLUCOPHAGE) 1000 MG tablet Take 1 tablet by mouth 2 times daily (with meals) 180 tablet 1    atorvastatin (LIPITOR) 40 MG tablet Take 1 tablet by mouth daily 90 tablet 1    losartan (COZAAR) 50 MG tablet Take 1 tablet by mouth daily 90 tablet 1       This patient presents to the office today for a preoperative consultation at the request of surgeon, Dr. Shelby Oglesby, who plans on performing cataract surgery on December 9 and December 16,2021       Known anesthesia problems: None    Patient Active Problem List   Diagnosis    Osteoarthritis    Type 2 diabetes mellitus without complication ([facility] [address]    Essential hypertension    Chronic low back pain    Primary osteoarthritis of both knees    Skin cyst    Subclinical hypothyroidism       Past Medical History:   Diagnosis Date    Back pain 12/18/2012    Cataract     per Dr Shelby Oglesby note 12/13/17    HTN (hypertension) 12/18/2012    Hyperlipidemia 12/18/2012    Impaired fasting glucose 12/18/2012    Osteoarthritis 12/18/2012    Type 2 diabetes mellitus without Not on file    Number of Places Lived in the Last Year: Not on file    Unstable Housing in the Last Year: Not on file       Review of Systems  A comprehensive review of systems was negative except for what was noted in the HPI. Physical Exam   Constitutional: He is oriented to person, place, and time. He appears well-developed and well-nourished. No distress. HENT:   Head: Normocephalic and atraumatic. Mouth/Throat: Uvula is midline, oropharynx is clear and moist and mucous membranes are normal.   Eyes: Conjunctivae and EOM are normal. Pupils are equal, round, and reactive to light. Neck: Trachea normal and normal range of motion. Neck supple. No JVD present. Carotid bruit is not present. No mass and no thyromegaly present. Cardiovascular: Normal rate, regular rhythm, normal heart sounds and intact distal pulses. Exam reveals no gallop and no friction rub. No murmur heard. Pulmonary/Chest: Effort normal and breath sounds normal. No respiratory distress. He has no wheezes. He has no rales. Abdominal: Soft. Normal aorta and bowel sounds are normal. He exhibits no distension and no mass. There is no hepatosplenomegaly. No tenderness. Musculoskeletal: He exhibits no edema and no tenderness. Neurological: He is alert and oriented to person, place, and time. He has normal strength. No cranial nerve deficit or sensory deficit. Coordination and gait normal.   Skin: Skin is warm and dry. No rash noted. No erythema. Psychiatric: He has a normal mood and affect. His behavior is normal.          Assessment:       77 y.o. patient with planned surgery as above. Known risk factors for perioperative complications: Diabetes mellitus, Hypertension        Plan:     1. Preoperative workup as follows: none  2.  Change in medication regimen before surgery: Take atenolol and losartan on morning of surgery with sip of water, and hold all other medications until after surgery, Discontinue ASA 7 days before surgery  3. Prophylaxis for cardiac events with perioperative beta-blockers: Currently taking  atenolol  4. No contraindications to planned surgery

## 2022-02-10 ENCOUNTER — OFFICE VISIT (OUTPATIENT)
Dept: PRIMARY CARE CLINIC | Age: 77
End: 2022-02-10
Payer: COMMERCIAL

## 2022-02-10 VITALS
WEIGHT: 182.8 LBS | SYSTOLIC BLOOD PRESSURE: 142 MMHG | OXYGEN SATURATION: 97 % | TEMPERATURE: 97.2 F | BODY MASS INDEX: 30.89 KG/M2 | DIASTOLIC BLOOD PRESSURE: 87 MMHG | HEART RATE: 67 BPM | RESPIRATION RATE: 18 BRPM

## 2022-02-10 DIAGNOSIS — I10 ESSENTIAL HYPERTENSION: ICD-10-CM

## 2022-02-10 DIAGNOSIS — E03.8 SUBCLINICAL HYPOTHYROIDISM: ICD-10-CM

## 2022-02-10 DIAGNOSIS — E78.2 MIXED HYPERLIPIDEMIA: ICD-10-CM

## 2022-02-10 DIAGNOSIS — E11.9 TYPE 2 DIABETES MELLITUS WITHOUT COMPLICATION, WITHOUT LONG-TERM CURRENT USE OF INSULIN (HCC): Primary | ICD-10-CM

## 2022-02-10 DIAGNOSIS — M17.0 PRIMARY OSTEOARTHRITIS OF BOTH KNEES: ICD-10-CM

## 2022-02-10 PROCEDURE — 99214 OFFICE O/P EST MOD 30 MIN: CPT | Performed by: FAMILY MEDICINE

## 2022-02-10 RX ORDER — ATENOLOL 50 MG/1
50 TABLET ORAL DAILY
Qty: 90 TABLET | Refills: 1 | Status: SHIPPED | OUTPATIENT
Start: 2022-02-10 | End: 2022-08-09

## 2022-02-10 RX ORDER — LOSARTAN POTASSIUM 100 MG/1
100 TABLET ORAL DAILY
Qty: 90 TABLET | Refills: 1 | Status: SHIPPED | OUTPATIENT
Start: 2022-02-10 | End: 2022-08-09

## 2022-02-10 ASSESSMENT — ENCOUNTER SYMPTOMS
BLOOD IN STOOL: 0
ABDOMINAL PAIN: 0
CONSTIPATION: 0
TROUBLE SWALLOWING: 0
VOICE CHANGE: 0
SHORTNESS OF BREATH: 0
DIARRHEA: 0

## 2022-02-10 NOTE — PROGRESS NOTES
2/10/2022     Safia Fournier (:  1945) is a 77 y.o. male, here for evaluation of the following medical concerns:    HPI  Patient presented to the office for regular follow-up. He has diabetes controlled with Metformin 1000 mg twice daily. Denies hypoglycemic episode. He has hypertension and blood pressure is elevated today despite losartan 50 mg daily and atenolol 50 mg daily. He has hyperlipidemia and reported to be compliant with a statin. He takes Lipitor 40 mg daily as well as TriCor 145 mg daily. He is tolerating statin without side effect. He has arthritis especially both knees but not ready for surgical treatment. He takes Tylenol as needed. He has subclinical hypothyroidism. He denies chest pain or shortness of breath. Denies bowel or urinary disturbance. Review of Systems   Constitutional: Negative for activity change. HENT: Negative for trouble swallowing and voice change. Eyes: Negative for visual disturbance. Respiratory: Negative for shortness of breath. Cardiovascular: Negative for chest pain and leg swelling. Gastrointestinal: Negative for abdominal pain, blood in stool, constipation and diarrhea. Genitourinary: Negative for difficulty urinating, dysuria, frequency, hematuria and scrotal swelling. Musculoskeletal: Negative for arthralgias and myalgias. Skin: Negative for rash. Neurological: Negative for dizziness. Psychiatric/Behavioral: Negative for behavioral problems. Prior to Visit Medications    Medication Sig Taking?  Authorizing Provider   atenolol (TENORMIN) 50 MG tablet Take 1 tablet by mouth daily Yes Hood Luevano MD   losartan (COZAAR) 100 MG tablet Take 1 tablet by mouth daily Yes Hood Luevano MD   fenofibrate (TRICOR) 145 MG tablet Take 1 tablet by mouth daily Yes Hood Luevano MD   metFORMIN (GLUCOPHAGE) 1000 MG tablet Take 1 tablet by mouth 2 times daily (with meals) Yes Hood Luevano MD   atorvastatin (LIPITOR) 40 MG tablet Take 1 tablet by mouth daily Yes Tiffanie Polanco MD   aspirin 81 MG tablet Take 81 mg by mouth daily. Patient not taking: Reported on 12/1/2021  Historical Provider, MD        Social History     Tobacco Use    Smoking status: Never Smoker    Smokeless tobacco: Never Used   Substance Use Topics    Alcohol use: No        Vitals:    02/10/22 1000 02/10/22 1013   BP: (!) 147/87 (!) 142/87   Pulse: 67    Resp: 18    Temp: 97.2 °F (36.2 °C)    TempSrc: Temporal    SpO2: 97%    Weight: 182 lb 12.8 oz (82.9 kg)      Estimated body mass index is 30.89 kg/m² as calculated from the following:    Height as of 8/5/21: 5' 4.5\" (1.638 m). Weight as of this encounter: 182 lb 12.8 oz (82.9 kg). Physical Exam  Constitutional:       General: He is not in acute distress. Appearance: He is well-developed. HENT:      Head: Normocephalic. Eyes:      Conjunctiva/sclera: Conjunctivae normal.   Neck:      Thyroid: No thyromegaly. Cardiovascular:      Rate and Rhythm: Normal rate and regular rhythm. Heart sounds: Normal heart sounds. No murmur heard. Pulmonary:      Effort: No respiratory distress. Breath sounds: Normal breath sounds. No wheezing or rales. Abdominal:      General: There is no distension. Palpations: Abdomen is soft. Musculoskeletal:         General: Normal range of motion. Cervical back: Neck supple. Skin:     General: Skin is warm. Findings: No rash. Neurological:      Mental Status: He is alert and oriented to person, place, and time. Psychiatric:         Behavior: Behavior normal.         ASSESSMENT/PLAN:  1. Type 2 diabetes mellitus without complication, without long-term current use of insulin (HCC)  Controlled. Continue Metformin 1000 mg twice daily. 2. Essential hypertension  Blood pressure still elevated. Will increase losartan from 50 mg to 100 mg daily. Continue atenolol 50 mg daily. 3. Mixed hyperlipidemia  Controlled.   Continue Lipitor 40 mg daily. Will check lipid panel next blood draw    4. Primary osteoarthritis of both knees  He is not ready for surgical intervention. Advised to take Tylenol as needed. 5. Subclinical hypothyroidism  We will continue to monitor thyroid panel. RTC around July.  Blood test next visit    An electronic signature was used to authenticate this note.    --Beata Sainz MD on 2/10/2022 at 1:36 PM

## 2022-02-14 RX ORDER — ATORVASTATIN CALCIUM 40 MG/1
40 TABLET, FILM COATED ORAL DAILY
Qty: 90 TABLET | Refills: 1 | Status: SHIPPED | OUTPATIENT
Start: 2022-02-14 | End: 2022-09-21

## 2022-02-14 RX ORDER — LOSARTAN POTASSIUM 50 MG/1
TABLET ORAL
Qty: 90 TABLET | OUTPATIENT
Start: 2022-02-14

## 2022-03-14 RX ORDER — FENOFIBRATE 145 MG/1
145 TABLET, COATED ORAL DAILY
Qty: 90 TABLET | Refills: 1 | Status: SHIPPED | OUTPATIENT
Start: 2022-03-14 | End: 2022-09-14

## 2022-08-09 RX ORDER — ATENOLOL 50 MG/1
50 TABLET ORAL DAILY
Qty: 90 TABLET | Refills: 0 | Status: SHIPPED | OUTPATIENT
Start: 2022-08-09

## 2022-08-09 RX ORDER — LOSARTAN POTASSIUM 100 MG/1
100 TABLET ORAL DAILY
Qty: 90 TABLET | Refills: 0 | Status: SHIPPED | OUTPATIENT
Start: 2022-08-09

## 2022-08-11 ENCOUNTER — TELEPHONE (OUTPATIENT)
Dept: PRIMARY CARE CLINIC | Age: 77
End: 2022-08-11

## 2022-08-11 ENCOUNTER — OFFICE VISIT (OUTPATIENT)
Dept: PRIMARY CARE CLINIC | Age: 77
End: 2022-08-11
Payer: COMMERCIAL

## 2022-08-11 VITALS
RESPIRATION RATE: 18 BRPM | DIASTOLIC BLOOD PRESSURE: 80 MMHG | SYSTOLIC BLOOD PRESSURE: 167 MMHG | TEMPERATURE: 97.2 F | BODY MASS INDEX: 30.86 KG/M2 | HEART RATE: 55 BPM | WEIGHT: 182.6 LBS

## 2022-08-11 DIAGNOSIS — E11.9 TYPE 2 DIABETES MELLITUS WITHOUT COMPLICATION, WITHOUT LONG-TERM CURRENT USE OF INSULIN (HCC): ICD-10-CM

## 2022-08-11 DIAGNOSIS — Z00.00 MEDICARE ANNUAL WELLNESS VISIT, SUBSEQUENT: ICD-10-CM

## 2022-08-11 DIAGNOSIS — I10 ESSENTIAL HYPERTENSION: ICD-10-CM

## 2022-08-11 DIAGNOSIS — Z12.5 SCREENING PSA (PROSTATE SPECIFIC ANTIGEN): ICD-10-CM

## 2022-08-11 DIAGNOSIS — E03.8 SUBCLINICAL HYPOTHYROIDISM: ICD-10-CM

## 2022-08-11 DIAGNOSIS — E78.2 MIXED HYPERLIPIDEMIA: ICD-10-CM

## 2022-08-11 DIAGNOSIS — I10 ESSENTIAL HYPERTENSION: Primary | ICD-10-CM

## 2022-08-11 LAB
A/G RATIO: 2.1 (ref 1.1–2.2)
ALBUMIN SERPL-MCNC: 4.4 G/DL (ref 3.4–5)
ALP BLD-CCNC: 36 U/L (ref 40–129)
ALT SERPL-CCNC: 15 U/L (ref 10–40)
ANION GAP SERPL CALCULATED.3IONS-SCNC: 14 MMOL/L (ref 3–16)
AST SERPL-CCNC: 24 U/L (ref 15–37)
BASOPHILS ABSOLUTE: 0 K/UL (ref 0–0.2)
BASOPHILS RELATIVE PERCENT: 0.8 %
BILIRUB SERPL-MCNC: 0.8 MG/DL (ref 0–1)
BUN BLDV-MCNC: 23 MG/DL (ref 7–20)
CALCIUM SERPL-MCNC: 9.3 MG/DL (ref 8.3–10.6)
CHLORIDE BLD-SCNC: 102 MMOL/L (ref 99–110)
CHOLESTEROL, FASTING: 119 MG/DL (ref 0–199)
CO2: 23 MMOL/L (ref 21–32)
CREAT SERPL-MCNC: 1.2 MG/DL (ref 0.8–1.3)
EOSINOPHILS ABSOLUTE: 0.3 K/UL (ref 0–0.6)
EOSINOPHILS RELATIVE PERCENT: 6.3 %
GFR AFRICAN AMERICAN: >60
GFR NON-AFRICAN AMERICAN: 59
GLUCOSE FASTING: 112 MG/DL (ref 70–99)
HBA1C MFR BLD: 5.8 %
HCT VFR BLD CALC: 42.9 % (ref 40.5–52.5)
HDLC SERPL-MCNC: 49 MG/DL (ref 40–60)
HEMOGLOBIN: 14.2 G/DL (ref 13.5–17.5)
LDL CHOLESTEROL CALCULATED: 59 MG/DL
LYMPHOCYTES ABSOLUTE: 1 K/UL (ref 1–5.1)
LYMPHOCYTES RELATIVE PERCENT: 20 %
MCH RBC QN AUTO: 30.8 PG (ref 26–34)
MCHC RBC AUTO-ENTMCNC: 33 G/DL (ref 31–36)
MCV RBC AUTO: 93.4 FL (ref 80–100)
MONOCYTES ABSOLUTE: 0.4 K/UL (ref 0–1.3)
MONOCYTES RELATIVE PERCENT: 9.1 %
NEUTROPHILS ABSOLUTE: 3.1 K/UL (ref 1.7–7.7)
NEUTROPHILS RELATIVE PERCENT: 63.8 %
PDW BLD-RTO: 14.7 % (ref 12.4–15.4)
PLATELET # BLD: 251 K/UL (ref 135–450)
PMV BLD AUTO: 7.6 FL (ref 5–10.5)
POTASSIUM SERPL-SCNC: 4 MMOL/L (ref 3.5–5.1)
PROSTATE SPECIFIC ANTIGEN: 1.35 NG/ML (ref 0–4)
RBC # BLD: 4.59 M/UL (ref 4.2–5.9)
SODIUM BLD-SCNC: 139 MMOL/L (ref 136–145)
T4 FREE: 1.3 NG/DL (ref 0.9–1.8)
TOTAL PROTEIN: 6.5 G/DL (ref 6.4–8.2)
TRIGLYCERIDE, FASTING: 55 MG/DL (ref 0–150)
TSH SERPL DL<=0.05 MIU/L-ACNC: 6.77 UIU/ML (ref 0.27–4.2)
VLDLC SERPL CALC-MCNC: 11 MG/DL
WBC # BLD: 4.9 K/UL (ref 4–11)

## 2022-08-11 PROCEDURE — 99213 OFFICE O/P EST LOW 20 MIN: CPT | Performed by: FAMILY MEDICINE

## 2022-08-11 PROCEDURE — 36415 COLL VENOUS BLD VENIPUNCTURE: CPT | Performed by: FAMILY MEDICINE

## 2022-08-11 PROCEDURE — 83036 HEMOGLOBIN GLYCOSYLATED A1C: CPT | Performed by: FAMILY MEDICINE

## 2022-08-11 PROCEDURE — G0439 PPPS, SUBSEQ VISIT: HCPCS | Performed by: FAMILY MEDICINE

## 2022-08-11 PROCEDURE — 1123F ACP DISCUSS/DSCN MKR DOCD: CPT | Performed by: FAMILY MEDICINE

## 2022-08-11 RX ORDER — METFORMIN HYDROCHLORIDE 750 MG/1
750 TABLET, EXTENDED RELEASE ORAL EVERY EVENING
Qty: 90 TABLET | Refills: 1 | Status: SHIPPED | OUTPATIENT
Start: 2022-08-11

## 2022-08-11 RX ORDER — AMLODIPINE BESYLATE 5 MG/1
5 TABLET ORAL DAILY
Qty: 90 TABLET | Refills: 1 | Status: SHIPPED | OUTPATIENT
Start: 2022-08-11

## 2022-08-11 ASSESSMENT — PATIENT HEALTH QUESTIONNAIRE - PHQ9
SUM OF ALL RESPONSES TO PHQ QUESTIONS 1-9: 0
1. LITTLE INTEREST OR PLEASURE IN DOING THINGS: 0
2. FEELING DOWN, DEPRESSED OR HOPELESS: 0
SUM OF ALL RESPONSES TO PHQ QUESTIONS 1-9: 0
SUM OF ALL RESPONSES TO PHQ QUESTIONS 1-9: 0
SUM OF ALL RESPONSES TO PHQ9 QUESTIONS 1 & 2: 0
SUM OF ALL RESPONSES TO PHQ QUESTIONS 1-9: 0

## 2022-08-11 ASSESSMENT — LIFESTYLE VARIABLES: HOW OFTEN DO YOU HAVE A DRINK CONTAINING ALCOHOL: NEVER

## 2022-08-11 NOTE — PROGRESS NOTES
Medicare Annual Wellness Visit    Guillermo West is here for Medicare AWV    Assessment & Plan   Medicare annual wellness visit, subsequent    Type 2 diabetes mellitus without complication, without long-term current use of insulin ([address]  Controlled. HbA1c today 6.1%. Will reduce metformin XR to 750 mg daily  -     POCT glycosylated hemoglobin (Hb A1C)    Essential hypertension  Blood pressure is still elevated. Will add amlodipine 5 mg daily. Continue atenolol 50 mg daily and losartan 100 mg daily    Mixed hyperlipidemia  Controlled. Continue Lipitor 40 mg daily and fenofibrate 145 mg daily. Check lipid panel today. Recommendations for Preventive Services Due: see orders and patient instructions/AVS.  Recommended screening schedule for the next 5-10 years is provided to the patient in written form: see Patient Instructions/AVS.     Return in 1 year (on 8/11/2023) for Medicare Annual Wellness Visit in 1 year. Subjective   The following acute and/or chronic problems were also addressed today:  Patient has hypertension and blood pressure is still elevated despite atenolol 50 and losartan now at 100 mg daily. She has diabetes and reported that he only takes metformin 1000 mg once a day, HbA1c today 6.1%, denies hypoglycemic episode, reported that twice a day metformin gave him him diarrhea. He also have hyperlipidemia and reported to be compliant with a statin, takes Lipitor 40 mg daily plus fenofibrate 145 daily, tolerating medication without side effect. He denies headache nausea vomiting denies chest pain or shortness of breath denies bowel or urinary disturbance. He has a chronic knee pain but there is stable pain is under control. Patient's complete Health Risk Assessment and screening values have been reviewed and are found in Flowsheets. The following problems were reviewed today and where indicated follow up appointments were made and/or referrals ordered.     Positive Risk Factor Screenings with Interventions:    Fall Risk:  Do you feel unsteady or are you worried about falling? : (!) yes  2 or more falls in past year?: no  Fall with injury in past year?: no   Fall Risk Interventions:    Home safety tips provided  Patient declines any further evaluation/treatment for this issue            General Health and ACP:  General  In general, how would you say your health is?: Good  In the past 7 days, have you experienced any of the following: New or Increased Pain, New or Increased Fatigue, Loneliness, Social Isolation, Stress or Anger?: (!) Yes  Select all that apply: (!) New or Increased Pain (bilat knee pain)  Do you get the social and emotional support that you need?: Yes  Do you have a Living Will?: (!) No    Advance Directives       Power of  Living Will ACP-Advance Directive ACP-Power of     Not on File Filed on 07/30/19 Filed Not on File        General Health Risk Interventions:  No Living Will: [address] addressed with patient today    Health Habits/Nutrition:  Physical Activity: Sufficiently Active    Days of Exercise per Week: 4 days    Minutes of Exercise per Session: 40 min     Have you lost any weight without trying in the past 3 months?: No     Have you seen the dentist within the past year?: Yes  Health Habits/Nutrition Interventions:  Continue regular exercise    Hearing/Vision:  Do you or your family notice any trouble with your hearing that hasn't been managed with hearing aids?: No  Do you have difficulty driving, watching TV, or doing any of your daily activities because of your eyesight?: (!) Yes  Have you had an eye exam within the past year?: Yes  No results found.   Hearing/Vision Interventions:  Vision concerns:  patient encouraged to make appointment with his/her eye specialist            Objective   Vitals:    08/11/22 0935 08/11/22 1001   BP: (!) 159/83 (!) 167/80   Pulse: 55    Resp: 18    Temp: 97.2 °F (36.2 °C)    TempSrc: Temporal Weight: 182 lb 9.6 oz (82.8 kg)       Body mass index is 30.86 kg/m². General Appearance: alert and oriented to person, place and time, well developed and well- nourished, in no acute distress  Skin: warm and dry, no rash or erythema  Head: normocephalic and atraumatic  Eyes: pupils equal, round, and reactive to light, extraocular eye movements intact, conjunctivae normal  ENT: tympanic membrane, external ear and ear canal normal bilaterally, nose without deformity, nasal mucosa and turbinates normal without polyps  Neck: supple and non-tender without mass, no thyromegaly or thyroid nodules, no cervical lymphadenopathy  Pulmonary/Chest: clear to auscultation bilaterally- no wheezes, rales or rhonchi, normal air movement, no respiratory distress  Cardiovascular: normal rate, regular rhythm, normal S1 and S2, no murmurs, rubs, clicks, or gallops, distal pulses intact, no carotid bruits  Abdomen: soft, non-tender, non-distended, normal bowel sounds, no masses or organomegaly  Extremities: no cyanosis, clubbing or edema  Musculoskeletal: normal range of motion, no joint swelling, deformity or tenderness  Neurologic: reflexes normal and symmetric, no cranial nerve deficit, gait, coordination and speech normal       Allergies   Allergen Reactions    Lisinopril      Ace cough     Prior to Visit Medications    Medication Sig Taking? Authorizing Provider   amLODIPine (NORVASC) 5 MG tablet Take 1 tablet by mouth in the morning. Yes Maryellen Perez MD   metFORMIN (GLUCOPHAGE-XR) 750 MG extended release tablet Take 1 tablet by mouth every evening Take with dinner. Yes Maryellen Perez MD   losartan (COZAAR) 100 MG tablet Take 1 tablet by mouth in the morning. Yes Maryellen Perez MD   atenolol (TENORMIN) 50 MG tablet Take 1 tablet by mouth in the morning.  Yes Maryellen Perez MD   fenofibrate (TRICOR) 145 MG tablet Take 1 tablet by mouth daily Yes Maryellen Perez MD   atorvastatin (LIPITOR) 40 MG tablet Take 1 tablet by mouth daily Yes Reno Gasca MD   aspirin 81 MG tablet Take 81 mg by mouth daily.   Patient not taking: No sig reported  Historical Provider, MD       CareTeam (Including outside providers/suppliers regularly involved in providing care):   Patient Care Team:  Reno Gasca MD as PCP - General (Internal Medicine)  Reno Gasca MD as PCP - REHABILITATION [facility] Empaneled Provider  MD Erickson as Consulting Physician (Ophthalmology)     Reviewed and updated this visit:  Tobacco  Allergies  Meds  Med Hx  Surg Hx  Soc Hx  Fam Hx

## 2022-09-14 RX ORDER — FENOFIBRATE 145 MG/1
145 TABLET, COATED ORAL DAILY
Qty: 90 TABLET | Refills: 1 | Status: SHIPPED | OUTPATIENT
Start: 2022-09-14

## 2022-09-21 RX ORDER — ATORVASTATIN CALCIUM 40 MG/1
40 TABLET, FILM COATED ORAL DAILY
Qty: 90 TABLET | Refills: 1 | Status: SHIPPED | OUTPATIENT
Start: 2022-09-21

## 2022-10-31 ENCOUNTER — OFFICE VISIT (OUTPATIENT)
Dept: PRIMARY CARE CLINIC | Age: 77
End: 2022-10-31
Payer: COMMERCIAL

## 2022-10-31 VITALS
TEMPERATURE: 97.3 F | HEART RATE: 60 BPM | BODY MASS INDEX: 31.54 KG/M2 | DIASTOLIC BLOOD PRESSURE: 71 MMHG | RESPIRATION RATE: 18 BRPM | SYSTOLIC BLOOD PRESSURE: 136 MMHG | WEIGHT: 186.6 LBS | OXYGEN SATURATION: 98 %

## 2022-10-31 DIAGNOSIS — E11.9 TYPE 2 DIABETES MELLITUS WITHOUT COMPLICATION, WITHOUT LONG-TERM CURRENT USE OF INSULIN (HCC): ICD-10-CM

## 2022-10-31 DIAGNOSIS — N18.31 STAGE 3A CHRONIC KIDNEY DISEASE (HCC): ICD-10-CM

## 2022-10-31 DIAGNOSIS — Z23 NEED FOR INFLUENZA VACCINATION: ICD-10-CM

## 2022-10-31 DIAGNOSIS — I10 ESSENTIAL HYPERTENSION: ICD-10-CM

## 2022-10-31 DIAGNOSIS — M25.551 ACUTE RIGHT HIP PAIN: ICD-10-CM

## 2022-10-31 PROBLEM — N18.30 CHRONIC RENAL DISEASE, STAGE III (HCC): Status: ACTIVE | Noted: 2022-10-31

## 2022-10-31 PROCEDURE — 1123F ACP DISCUSS/DSCN MKR DOCD: CPT | Performed by: FAMILY MEDICINE

## 2022-10-31 PROCEDURE — 3044F HG A1C LEVEL LT 7.0%: CPT | Performed by: FAMILY MEDICINE

## 2022-10-31 PROCEDURE — G0008 ADMIN INFLUENZA VIRUS VAC: HCPCS | Performed by: FAMILY MEDICINE

## 2022-10-31 PROCEDURE — 90694 VACC AIIV4 NO PRSRV 0.5ML IM: CPT | Performed by: FAMILY MEDICINE

## 2022-10-31 PROCEDURE — 3078F DIAST BP <80 MM HG: CPT | Performed by: FAMILY MEDICINE

## 2022-10-31 PROCEDURE — 99214 OFFICE O/P EST MOD 30 MIN: CPT | Performed by: FAMILY MEDICINE

## 2022-10-31 PROCEDURE — 3074F SYST BP LT 130 MM HG: CPT | Performed by: FAMILY MEDICINE

## 2022-10-31 RX ORDER — ACETAMINOPHEN 500 MG
500 TABLET ORAL 4 TIMES DAILY PRN
Qty: 120 TABLET | Refills: 5 | Status: SHIPPED | OUTPATIENT
Start: 2022-10-31

## 2022-10-31 ASSESSMENT — ENCOUNTER SYMPTOMS
DIARRHEA: 0
TROUBLE SWALLOWING: 0
CONSTIPATION: 0
ABDOMINAL PAIN: 0
BLOOD IN STOOL: 0
VOICE CHANGE: 0
SHORTNESS OF BREATH: 0

## 2022-10-31 NOTE — PROGRESS NOTES
10/31/2022     Tristian Mccoy (:  1945) is a 69 y.o. male, here for evaluation of the following medical concerns:    HPI  Patient presented to the office for follow-up. He complain of right hip pain involving the buttocks for the past 1 to 2 weeks. He denies recent injury or heavy lifting but he thinks it might be the way he sleep or about the matress. At times he could not sleep because of the discomfort. He denies bowel or urinary disturbance. He has diabetes controlled with metformin  mg daily, he has hyperlipidemia and reported to be compliant with a statin, takes Lipitor 40 mg daily and fenofibrate 140 mg daily. He has hypertension controlled with atenolol 50 daily, losartan 100 mg daily and amlodipine 5 mg daily. He denies chest pain or shortness of breath. Denies bowel or urinary disturbance. Review of Systems   Constitutional:  Negative for activity change. HENT:  Negative for trouble swallowing and voice change. Eyes:  Negative for visual disturbance. Respiratory:  Negative for shortness of breath. Cardiovascular:  Negative for chest pain and leg swelling. Gastrointestinal:  Negative for abdominal pain, blood in stool, constipation and diarrhea. Genitourinary:  Negative for difficulty urinating, dysuria, frequency, hematuria and scrotal swelling. Musculoskeletal:  Positive for arthralgias and myalgias. Skin:  Negative for rash. Neurological:  Negative for dizziness. Psychiatric/Behavioral:  Negative for behavioral problems. Prior to Visit Medications    Medication Sig Taking?  Authorizing Provider   acetaminophen (TYLENOL) 500 MG tablet Take 1 tablet by mouth 4 times daily as needed for Pain Yes Sandy Corrales MD   diclofenac sodium (VOLTAREN) 1 % GEL Apply 2 g topically 4 times daily Yes Sandy Corrales MD   atorvastatin (LIPITOR) 40 MG tablet Take 1 tablet by mouth daily  Sandy Corrales MD   fenofibrate (TRICOR) 145 MG tablet Take 1 tablet by mouth daily  Sam Cuevas MD   amLODIPine (NORVASC) 5 MG tablet Take 1 tablet by mouth in the morning. Sam Cuevas MD   metFORMIN (GLUCOPHAGE-XR) 750 MG extended release tablet Take 1 tablet by mouth every evening Take with dinner. Sam Cuevas MD   losartan (COZAAR) 100 MG tablet Take 1 tablet by mouth in the morning. Sam Cuevas MD   atenolol (TENORMIN) 50 MG tablet Take 1 tablet by mouth in the morning. Sam Cuevas MD   aspirin 81 MG tablet Take 81 mg by mouth daily. Patient not taking: No sig reported  Historical Provider, MD        Social History     Tobacco Use    Smoking status: Never    Smokeless tobacco: Never   Substance Use Topics    Alcohol use: No        Vitals:    10/31/22 1104   BP: 136/71   Pulse: 60   Resp: 18   Temp: 97.3 °F (36.3 °C)   TempSrc: Temporal   SpO2: 98%   Weight: 186 lb 9.6 oz (84.6 kg)     Estimated body mass index is 31.54 kg/m² as calculated from the following:    Height as of 8/5/21: 5' 4.5\" (1.638 m). Weight as of this encounter: 186 lb 9.6 oz (84.6 kg). Physical Exam  Constitutional:       Appearance: He is well-developed. HENT:      Head: Normocephalic. Eyes:      Conjunctiva/sclera: Conjunctivae normal.      Pupils: Pupils are equal, round, and reactive to light. Neck:      Thyroid: No thyromegaly. Cardiovascular:      Rate and Rhythm: Normal rate and regular rhythm. Heart sounds: Normal heart sounds. No murmur heard. Pulmonary:      Effort: Pulmonary effort is normal.      Breath sounds: Normal breath sounds. Abdominal:      General: Bowel sounds are normal.      Palpations: Abdomen is soft. There is no mass. Genitourinary:     Penis: Normal.       Prostate: Normal.   Musculoskeletal:         General: Normal range of motion. Cervical back: Normal range of motion and neck supple. Skin:     General: Skin is warm. Findings: No rash. Neurological:      Mental Status: He is alert.    Psychiatric:         Behavior: Behavior normal.       ASSESSMENT/PLAN  1. Acute right hip pain  Likely has associated underlying right trochanteric bursitis. Will try Tylenol 500 mg 3 or 4 times a day plus Voltaren gel as needed. If not improved in 4 weeks will refer to [facility]  - acetaminophen (TYLENOL) 500 MG tablet; Take 1 tablet by mouth 4 times daily as needed for Pain  Dispense: 120 tablet; Refill: 5  - diclofenac sodium (VOLTAREN) 1 % GEL; Apply 2 g topically 4 times daily  Dispense: 100 g; Refill: 3    2. Type 2 diabetes mellitus without complication, without long-term current use of insulin (HCC)  Controlled. Continue meds  mg daily    3. Essential hypertension  Controlled. Continue amlodipine 5 mg daily, losartan 100 mg daily and atenolol 50 mg daily. 4. Stage 3a chronic kidney disease ([facility]  Stable renal profile. Avoid NSAIDs and other nephrotoxic drugs.     5. Need for influenza vaccination  - Influenza, FLUAD, (age 72 y+), IM, PF, 0.5 mL      RTC in 3 mos    An electronic signature was used to authenticate this note.    --Mary Walker MD on 10/31/2022 at 12:39 PM

## 2022-11-01 ENCOUNTER — TELEPHONE (OUTPATIENT)
Dept: ADMINISTRATIVE | Age: 77
End: 2022-11-01

## 2022-11-01 NOTE — TELEPHONE ENCOUNTER
Submitted PA for [employer]  [facility] Key: [id number] STATUS: APPROVED from 8/3/22-11/01/23.     APPROVAL letter scanned into the chart

## 2022-11-08 ENCOUNTER — OFFICE VISIT (OUTPATIENT)
Dept: ORTHOPEDIC SURGERY | Age: 77
End: 2022-11-08
Payer: COMMERCIAL

## 2022-11-08 VITALS — WEIGHT: 180 LBS | RESPIRATION RATE: 16 BRPM | BODY MASS INDEX: 29.99 KG/M2 | HEIGHT: 65 IN

## 2022-11-08 DIAGNOSIS — M48.061 NEURAL FORAMINAL STENOSIS OF LUMBAR SPINE: ICD-10-CM

## 2022-11-08 DIAGNOSIS — M54.50 LOW BACK PAIN, UNSPECIFIED BACK PAIN LATERALITY, UNSPECIFIED CHRONICITY, UNSPECIFIED WHETHER SCIATICA PRESENT: Primary | ICD-10-CM

## 2022-11-08 PROCEDURE — 1123F ACP DISCUSS/DSCN MKR DOCD: CPT | Performed by: PHYSICIAN ASSISTANT

## 2022-11-08 PROCEDURE — 99203 OFFICE O/P NEW LOW 30 MIN: CPT | Performed by: PHYSICIAN ASSISTANT

## 2022-11-08 RX ORDER — GABAPENTIN 100 MG/1
100 CAPSULE ORAL NIGHTLY
Qty: 30 CAPSULE | Refills: 0 | Status: SHIPPED | OUTPATIENT
Start: 2022-11-08 | End: 2022-12-08

## 2022-11-08 NOTE — PROGRESS NOTES
History of present illness:   Mr. Oliva Snyder is a pleasant 69 y.o. male kindly referred by Germain Tang MD for consultation regarding his right-sided low back pain and right leg radicular pain. His pain began rather suddenly on 10/13/2022. He was awakened in the middle of the night with right-sided low back pain, right buttock pain and right posterior lateral thigh pain. Shortly after getting up and moving around his symptoms subsided. Now he reports every time he lays in bed on either his left or right side he develops pain in the right side of his lower back that radiates down his right leg. He denies a specific injury or activity that may have brought on the symptoms. Pain has steadily worsened since onset. Back pain 10/10 VAS, right buttock pain 2/10 VAS, right leg pain 10/10 VAS. Pain is describes as aching, throbbing pain with associated numbness and tingling in the right leg and into the right great toe. He denies weakness of his right and left leg. He denies bowel or bladder dysfunction and saddle anesthesia. He can sit for a maximum of unlimited minutes and stand for a maximum unlimited minutes. Pain does disrupt his sleep. Prior medications and treatment tried include Tylenol and ibuprofen without relief. He has also tried Voltaren gel without relief. Past medical history:  His past medical history has been reviewed. Past Medical History:   Diagnosis Date    Back pain 12/18/2012    Cataract     per Dr Cornelio Flynn note 12/13/17    HTN (hypertension) 12/18/2012    Hyperlipidemia 12/18/2012    Impaired fasting glucose 12/18/2012    Osteoarthritis 12/18/2012    Type 2 diabetes mellitus without complication ([facility] 0/23/9131       His past surgical history has been reviewed. Past Surgical History:   Procedure Laterality Date    LAPAROSCOPIC APPENDECTOMY  07/14/2017    Laparoscopic appendectomy         His medications and allergies were reviewed.   Current Outpatient Medications Medication Sig Dispense Refill    gabapentin (NEURONTIN) 100 MG capsule Take 1 capsule by mouth nightly for 30 days. Intended supply: 30 days 30 capsule 0    acetaminophen (TYLENOL) 500 MG tablet Take 1 tablet by mouth 4 times daily as needed for Pain 120 tablet 5    diclofenac sodium (VOLTAREN) 1 % GEL Apply 2 g topically 4 times daily 100 g 3    atorvastatin (LIPITOR) 40 MG tablet Take 1 tablet by mouth daily 90 tablet 1    fenofibrate (TRICOR) 145 MG tablet Take 1 tablet by mouth daily 90 tablet 1    amLODIPine (NORVASC) 5 MG tablet Take 1 tablet by mouth in the morning. 90 tablet 1    metFORMIN (GLUCOPHAGE-XR) 750 MG extended release tablet Take 1 tablet by mouth every evening Take with dinner. 90 tablet 1    losartan (COZAAR) 100 MG tablet Take 1 tablet by mouth in the morning. 90 tablet 0    atenolol (TENORMIN) 50 MG tablet Take 1 tablet by mouth in the morning. 90 tablet 0    aspirin 81 MG tablet Take 81 mg by mouth daily. (Patient not taking: No sig reported)       No current facility-administered medications for this visit. His social history has been reviewed. Social History     Occupational History    Not on file   Tobacco Use    Smoking status: Never    Smokeless tobacco: Never   Substance and Sexual Activity    Alcohol use: No    Drug use: No    Sexual activity: Not on file         His family history has been reviewed. Family History   Problem Relation Age of Onset    Stroke Father     Cancer Sister          Review of Systems:  I have reviewed the clinically relevant past medical history, medications, allergies, family history, social history, and 13 point Review of Systems from the patient's recent history form & documented any details relevant to today's presenting complaints in the history above.  The patient's self-reported past medical history, medications, allergies, family history, social history, and Review of Systems and spine forms from today's date have been scanned into the chart under the \"Media\" tab. Review of symptoms was reviewed and is significant for back pain and negative for recent weight loss, fatigue, chills, visual disturbances, blood in stool or urine, recent infection. Physical examination:  Mr. Patty Cui's most recent vitals:  Vitals  Resp: 16  Height: 5' 5\" (165.1 cm)  Weight: 180 lb (81.6 kg)  Body mass index is 29.95 kg/m². General exam:  He is well-developed and well-nourished, is in obvious discomfort and alert and oriented to person, place, and time. He demonstrates appropriate mood and affect. His skin is warm and dry. His gait is normal and he walks heel to toe without significant limp or instability. Back:  He stands with slight lumbar flexion. His lumbar flexion, extension and lateral bending are mildly reduced with pain. He has mild tenderness over his lumbar spine without obvious muscle spasm. The skin over his lumbar spine is normal without a surgical scar. Lower extremities:  He has 5/5 motor strength of bilateral lower extremities. He has a negative straight leg raise, bilaterally. Deep tendon reflexes: Absent at the patella and Achilles symmetrically  Sensation is intact to light touch L3 to S1 bilaterally. He has no clonus. Hip range of motion is normal.  Stinchfield test is negative. Imaging:  X rays AP and lateral lumbar spine obtained in the office today. X-rays show moderate scoliosis of the lower lumbar spine with significant rotation. Advanced degenerative disc disease and facet arthritis also noted.     Diagnosis:      ICD-10-CM    1. Low back pain, unspecified back pain laterality, unspecified chronicity, unspecified whether sciatica present  M54.50 XR LUMBAR SPINE (2-3 VIEWS)      2. Neural foraminal stenosis of lumbar spine  M48.061 MRI LUMBAR SPINE WO CONTRAST           Assessment/ Plan:    Low back pain and right leg radicular pain most likely due to foraminal stenosis contributed by his scoliosis, degenerative disc disease and facet arthritis. Symptoms are worse lying in bed on his left or right side. I had an extensive discussion with Mr. Katelyn Story and/or family regarding the natural history, etiology, and long term consequences of his condition. I have presented reasonable alternatives to the patient's proposed care, treatment, and services. Risks and benefits of the treatment options also reviewed in detail. I have outlined a treatment plan with them. He has had full opportunity to ask his questions. I have answered them all to his satisfaction. I feel that Mr. Katelyn Story understands our discussion today. New Medications prescribed today:  Gabapentin 100 mg nightly. OTC NSAIDS discussed. The most common side effects from NSAIDs are stomachaches, heartburn, and nausea. NSAIDs may irritate the stomach lining. If the medicine upsets your stomach, you can try taking it with food. But if that doesn't help, talk with your doctor to make sure it's not a more serious problem, such as a stomach ulcer or bleeding in the stomach or intestines. Using NSAIDs may:  Lead to high blood pressure. Make symptoms of heart failure worse. Raise the risk of heart attack, stroke, kidney damage, and skin reactions. Your risks are greater if you take NSAIDs at higher doses or for longer than the label says. People who are older than 72 or who have heart, stomach, or intestinal disease have a higher risk for problems. Further Imaging:  Obtain a MRI of the lumbar spine to evaluate severity and levels of stenosis to better plan for lumbar epidural steroid injection. He has been doing a home exercise therapy program without relief of symptoms. He is having great difficulty sleeping secondarily to pain complaints. Procedures:  Briefly discussed epidural steroid injections. Patient information provided today. Follow up:   Follow-up after MRI to discuss results and treatment options. He was instructed to call us emergently if he begins to experience bowel or bladder dysfunction, saddle anesthesia, increasing muscle weakness, or onset/ worsening leg symptoms. The total time spent on today's visit including reviewing test results, history, performance of physical exam, counseling/ education, ordering of medications, tests or procedures was 35 minutes. This time does include completion of the medical record. This time excludes any time spent performing procedures or tests in the office. Carmela Mike PA-C   Senior Physician Assistant   [facility]                                         Disclaimer: This note was generated with use of a verbal recognition program (DRAGON) and an attempt was made to check for errors. It is possible that there are still dictated errors within this office note. If so, please bring any significant errors to my attention for an addendum. All efforts were made to ensure that this office note is accurate.

## 2022-11-14 RX ORDER — ATENOLOL 50 MG/1
50 TABLET ORAL DAILY
Qty: 90 TABLET | Refills: 1 | Status: SHIPPED | OUTPATIENT
Start: 2022-11-14

## 2022-11-14 RX ORDER — LOSARTAN POTASSIUM 100 MG/1
100 TABLET ORAL DAILY
Qty: 90 TABLET | Refills: 1 | Status: SHIPPED | OUTPATIENT
Start: 2022-11-14

## 2022-11-15 ENCOUNTER — HOSPITAL ENCOUNTER (OUTPATIENT)
Dept: MRI IMAGING | Age: 77
Discharge: HOME | End: 2022-11-15
Payer: COMMERCIAL

## 2022-11-15 DIAGNOSIS — M48.061 NEURAL FORAMINAL STENOSIS OF LUMBAR SPINE: ICD-10-CM

## 2022-11-15 PROCEDURE — 72148 MRI LUMBAR SPINE W/O DYE: CPT

## 2022-11-22 ENCOUNTER — OFFICE VISIT (OUTPATIENT)
Dept: ORTHOPEDIC SURGERY | Age: 77
End: 2022-11-22
Payer: COMMERCIAL

## 2022-11-22 DIAGNOSIS — M48.061 SPINAL STENOSIS OF LUMBAR REGION WITHOUT NEUROGENIC CLAUDICATION: Primary | ICD-10-CM

## 2022-11-22 PROCEDURE — 1123F ACP DISCUSS/DSCN MKR DOCD: CPT | Performed by: PHYSICIAN ASSISTANT

## 2022-11-22 PROCEDURE — 99213 OFFICE O/P EST LOW 20 MIN: CPT | Performed by: PHYSICIAN ASSISTANT

## 2022-11-22 NOTE — PROGRESS NOTES
Subjective:      Patient ID: Autumn Panda is a 69 y.o. male who is here for follow up evaluation. He continues to experience right-sided low back pain, right buttock pain and right posterior lateral thigh pain. Symptoms came on rather suddenly in the middle of the night 10/13/2022. Now having significant discomfort when trying to sleep. Did recently undergo MRI lumbar spine and he is here today to review this test results. Review Of Systems:   Review of symptoms was reviewed and is significant for back pain and negative for recent weight loss, fatigue, chills, visual disturbances, blood in stool or urine, recent infection. Past Medical History:   Diagnosis Date    Back pain 12/18/2012    Cataract     per Dr Panchal Asper note 12/13/17    HTN (hypertension) 12/18/2012    Hyperlipidemia 12/18/2012    Impaired fasting glucose 12/18/2012    Osteoarthritis 12/18/2012    [facility] 8/43/5203       Family History   Problem Relation Age of Onset    Stroke Father     Cancer Sister        Past Surgical History:   Procedure Laterality Date    LAPAROSCOPIC APPENDECTOMY  07/14/2017    Laparoscopic appendectomy       Social History     Occupational History    Not on file   Tobacco Use    Smoking status: Never    Smokeless tobacco: Never   Substance and Sexual Activity    Alcohol use: No    Drug use: No    Sexual activity: Not on file       Current Outpatient Medications   Medication Sig Dispense Refill    losartan (COZAAR) 100 MG tablet Take 1 tablet by mouth daily 90 tablet 1    atenolol (TENORMIN) 50 MG tablet Take 1 tablet by mouth daily 90 tablet 1    gabapentin (NEURONTIN) 100 MG capsule Take 1 capsule by mouth nightly for 30 days.  Intended supply: 30 days 30 capsule 0    acetaminophen (TYLENOL) 500 MG tablet Take 1 tablet by mouth 4 times daily as needed for Pain 120 tablet 5    diclofenac sodium (VOLTAREN) 1 % GEL Apply 2 g topically 4 times daily 100 g 3    atorvastatin (LIPITOR) 40 MG tablet Take 1 tablet by mouth daily 90 tablet 1    fenofibrate (TRICOR) 145 MG tablet Take 1 tablet by mouth daily 90 tablet 1    amLODIPine (NORVASC) 5 MG tablet Take 1 tablet by mouth in the morning. 90 tablet 1    metFORMIN (GLUCOPHAGE-XR) 750 MG extended release tablet Take 1 tablet by mouth every evening Take with dinner. 90 tablet 1    aspirin 81 MG tablet Take 81 mg by mouth daily. (Patient not taking: No sig reported)       No current facility-administered medications for this visit. Objective:     General exam:  He is well-developed and well-nourished, is in obvious discomfort and alert and oriented to person, place, and time. He demonstrates appropriate mood and affect. His skin is warm and dry. His gait is normal and he walks heel to toe without significant limp or instability. There were no vitals taken for this visit. MRI: [facility]bt[facility] from [facility] or an outside facility. Narrative   EXAMINATION:   MRI OF THE LUMBAR SPINE WITHOUT CONTRAST, 11/15/2022 10:24 am       TECHNIQUE:   Multiplanar multisequence MRI of the lumbar spine was performed without the   administration of intravenous contrast.       COMPARISON:   None       HISTORY:   ORDERING SYSTEM PROVIDED HISTORY: Neural foraminal stenosis of lumbar spine   TECHNOLOGIST PROVIDED HISTORY:   Reason for Exam: Severe right buttock pain for 4 weeks. No known injury. FINDINGS:   BONES/ALIGNMENT: There is a dextroconvex lumbar scoliosis. Alignment is   otherwise normal.  There is no acute fracture. There is disc desiccation,   disc space narrowing and moderate to severe degenerative endplate changes at   X9-L5, L4-L5 and L5-S1. There is no spondylolysis. Bone marrow signal   intensity is normal.       SPINAL CORD: The conus medullaris is normal in size and signal intensities   and terminates normally. SOFT TISSUES: There is no paraspinal mass identified. T11-T12:  There is a disc bulge and thickening of the ligamentum flavum but no   significant spinal canal stenosis or neural foraminal narrowing evident. T12-L1: There is no disc bulge or protrusion present. There is no   significant spinal canal stenosis or neural foraminal narrowing present. L1-L2: There is a disc bulge and facet arthropathy. No significant spinal   canal stenosis or neural foraminal narrowing is present. L2-L3: There is a disc bulge, facet arthropathy and thickening of the   ligamentum flavum. There is moderate spinal canal stenosis and moderate   right neural foraminal narrowing. No significant left neural foraminal   narrowing is present. L3-L4: There is a disc bulge, facet arthropathy and thickening of the   ligamentum flavum. There is mild spinal canal stenosis and severe left   neural foraminal narrowing. No significant right neural foraminal narrowing   is present. L4-L5: There is a disc bulge, facet arthropathy and thickening of the   ligamentum flavum. There is posterior osteophyte formation. There is severe   spinal canal stenosis and severe bilateral neural foraminal narrowing, right   greater than left. L5-S1: There is a disc bulge, facet arthropathy and posterior osteophyte   formation. There is severe right neural foraminal narrowing. No significant   left neural foraminal narrowing is present. There is no significant spinal   canal stenosis. Impression   1. Severe spinal canal stenosis and severe bilateral neural foraminal   narrowing at L4-L5 secondary to a disc bulge, facet arthropathy, posterior   osteophyte formation and thickening of the ligamentum flavum. 2. Severe right neural foraminal narrowing at L5-S1 secondary to a disc   bulge, facet arthropathy and posterior osteophyte formation. 3. Moderate spinal canal stenosis and moderate right neural foraminal   narrowing at L2-L3, as described above.    4. Mild spinal canal stenosis and severe left neural foraminal narrowing at   L3-L4, as described above. 5. Additional multilevel degenerative changes, as described above. X Rays: not performed in the office today:       Diagnosis:       ICD-10-CM    1. Spinal stenosis of lumbar region without neurogenic claudication  M48.061 ZIGGY - Khloe Duenas MD, Pain Management, [facility]           Assessment and Plan:       Assessment: This is a pleasant 70-year-old gentleman who has severe spinal canal and foraminal stenosis L4-5, severe right neuroforaminal at L5-S1 and moderate spinal canal stenosis and right neuroforaminal narrowing at L2-3. I had an extensive discussion with Mr. Demetri Garza regarding the natural history, etiology, and long term consequences of his condition. I have presented reasonable alternatives to the patient's proposed care, treatment, and services. Risks and benefits of the treatment options also reviewed in detail. I have outlined a treatment plan with them. He has had full opportunity to ask his questions. I have answered them all to his satisfaction. I feel that Mr. Demetri Garza understands our discussion today. Plan:    Procedures-   At this time, I do not believe spinal surgery is indicated. He may benefit other therapeutic options such as epidural steroid injection, facet injection or other interventional procedures. For this reason, I am going to refer to Dr. Coralie Litten for Interventional Pain Management for an evaluation and treatment. Follow up-    Call or return to clinic if these symptoms worsen or fail to improve as anticipated. The total time spent on today's visit including reviewing test results, history, performance of physical exam, counseling/ education, ordering of medications, tests or procedures was 22 minutes. This time does include completion of the medical record. This time excludes any time spent performing procedures or tests in the office. Jakub Clark PA-C   Senior Physician Assistant   [facility]                                         Disclaimer: This note was generated with use of a verbal recognition program (DRAGON) and an attempt was made to check for errors. It is possible that there are still dictated errors within this office note. If so, please bring any significant errors to my attention for an addendum. All efforts were made to ensure that this office note is accurate.

## 2022-12-06 ENCOUNTER — OFFICE VISIT (OUTPATIENT)
Dept: PRIMARY CARE CLINIC | Age: 77
End: 2022-12-06
Payer: COMMERCIAL

## 2022-12-06 VITALS
DIASTOLIC BLOOD PRESSURE: 82 MMHG | RESPIRATION RATE: 18 BRPM | SYSTOLIC BLOOD PRESSURE: 139 MMHG | HEART RATE: 62 BPM | OXYGEN SATURATION: 98 % | TEMPERATURE: 97 F | WEIGHT: 189.4 LBS | BODY MASS INDEX: 31.52 KG/M2

## 2022-12-06 DIAGNOSIS — E78.2 MIXED HYPERLIPIDEMIA: ICD-10-CM

## 2022-12-06 DIAGNOSIS — E11.9 TYPE 2 DIABETES MELLITUS WITHOUT COMPLICATION, WITHOUT LONG-TERM CURRENT USE OF INSULIN (HCC): Primary | ICD-10-CM

## 2022-12-06 DIAGNOSIS — M48.061 SPINAL STENOSIS OF LUMBAR REGION WITHOUT NEUROGENIC CLAUDICATION: ICD-10-CM

## 2022-12-06 DIAGNOSIS — I10 ESSENTIAL HYPERTENSION: ICD-10-CM

## 2022-12-06 LAB — HBA1C MFR BLD: 6 %

## 2022-12-06 PROCEDURE — 99214 OFFICE O/P EST MOD 30 MIN: CPT | Performed by: FAMILY MEDICINE

## 2022-12-06 PROCEDURE — 83036 HEMOGLOBIN GLYCOSYLATED A1C: CPT | Performed by: FAMILY MEDICINE

## 2022-12-06 PROCEDURE — 3074F SYST BP LT 130 MM HG: CPT | Performed by: FAMILY MEDICINE

## 2022-12-06 PROCEDURE — 3044F HG A1C LEVEL LT 7.0%: CPT | Performed by: FAMILY MEDICINE

## 2022-12-06 PROCEDURE — 3078F DIAST BP <80 MM HG: CPT | Performed by: FAMILY MEDICINE

## 2022-12-06 PROCEDURE — 1123F ACP DISCUSS/DSCN MKR DOCD: CPT | Performed by: FAMILY MEDICINE

## 2022-12-06 ASSESSMENT — ENCOUNTER SYMPTOMS
ABDOMINAL PAIN: 0
BLOOD IN STOOL: 0
CONSTIPATION: 0
TROUBLE SWALLOWING: 0
DIARRHEA: 0
BACK PAIN: 1
SHORTNESS OF BREATH: 0
VOICE CHANGE: 0

## 2022-12-06 NOTE — PROGRESS NOTES
2022     Bertha Perez (:  1945) is a 69 y.o. male, here for evaluation of the following medical concerns:    Patient presented to the office for follow-up. He has right hip and back pain not improved despite Tylenol and Voltaren gel, was seen by orthopedic Erick De La Torre who ordered MRI of the lumbar spine which showed severe spinal stenosis with severe bilateral neural foraminal narrowing at L4 and L5 secondary to disc bulge. Also severe foraminal narrowing at L5 and S1 due to disc bulge. Patient was referred to pain management Dr Sharon Maier which patient has upcoming appointment next week. Patient has diabetes controlled with metformin  mg daily denies hypoglycemic episode. He has hypertension and blood pressure is borderline high due to back and hip pain, takes amlodipine losartan and atenolol. Patient has hyperlipidemia and reported to be compliant with a statin takes Lipitor 40 mg daily and fenofibrate 145 mg daily and tolerating medication without side effect. Has chronic kidney disease stage IIIa with a stable renal profile. He denies headache nausea vomiting. Denies chest pain or shortness of breath denies bowel or urinary disturbance. Review of Systems   Constitutional:  Negative for activity change. HENT:  Negative for trouble swallowing and voice change. Eyes:  Negative for visual disturbance. Respiratory:  Negative for shortness of breath. Cardiovascular:  Negative for chest pain and leg swelling. Gastrointestinal:  Negative for abdominal pain, blood in stool, constipation and diarrhea. Genitourinary:  Negative for difficulty urinating, dysuria, frequency, hematuria and scrotal swelling. Musculoskeletal:  Positive for back pain. Negative for arthralgias and myalgias. Skin:  Negative for rash. Neurological:  Negative for dizziness. Psychiatric/Behavioral:  Negative for behavioral problems.       Prior to Visit Medications    Medication Sig Taking? Authorizing Provider   losartan (COZAAR) 100 MG tablet Take 1 tablet by mouth daily Yes Ozzy Patel MD   atenolol (TENORMIN) 50 MG tablet Take 1 tablet by mouth daily Yes Ozzy Patel MD   acetaminophen (TYLENOL) 500 MG tablet Take 1 tablet by mouth 4 times daily as needed for Pain Yes Ozzy Patel MD   diclofenac sodium (VOLTAREN) 1 % GEL Apply 2 g topically 4 times daily Yes Ozzy Patel MD   atorvastatin (LIPITOR) 40 MG tablet Take 1 tablet by mouth daily Yes Ozzy Patel MD   fenofibrate (TRICOR) 145 MG tablet Take 1 tablet by mouth daily Yes Ozzy Patel MD   amLODIPine (NORVASC) 5 MG tablet Take 1 tablet by mouth in the morning. Yes Ozzy Patel MD   metFORMIN (GLUCOPHAGE-XR) 750 MG extended release tablet Take 1 tablet by mouth every evening Take with dinner. Yes Ozzy Patel MD   aspirin 81 MG tablet Take 81 mg by mouth daily. Patient not taking: No sig reported  Historical Provider, MD        Social History     Tobacco Use    Smoking status: Never    Smokeless tobacco: Never   Substance Use Topics    Alcohol use: No        Vitals:    12/06/22 0924 12/06/22 0930   BP: (!) 150/82 139/82   Pulse: 62    Resp: 18    Temp: 97 °F (36.1 °C)    TempSrc: Temporal    SpO2: 98%    Weight: 189 lb 6.4 oz (85.9 kg)      Estimated body mass index is 31.52 kg/m² as calculated from the following:    Height as of 11/15/22: 5' 5\" (1.651 m). Weight as of this encounter: 189 lb 6.4 oz (85.9 kg). Physical Exam  Constitutional:       Appearance: He is well-developed. HENT:      Head: Normocephalic. Eyes:      Conjunctiva/sclera: Conjunctivae normal.      Pupils: Pupils are equal, round, and reactive to light. Neck:      Thyroid: No thyromegaly. Cardiovascular:      Rate and Rhythm: Normal rate and regular rhythm. Heart sounds: Normal heart sounds. No murmur heard. Pulmonary:      Effort: Pulmonary effort is normal.      Breath sounds: Normal breath sounds.    Abdominal: General: Bowel sounds are normal.      Palpations: Abdomen is soft. There is no mass. Genitourinary:     Penis: Normal.       Prostate: Normal.   Musculoskeletal:         General: Normal range of motion. Cervical back: Normal range of motion and neck supple. Skin:     General: Skin is warm. Findings: No rash. Neurological:      Mental Status: He is alert. Psychiatric:         Behavior: Behavior normal.       ASSESSMENT/PLAN:  1. Type 2 diabetes mellitus without complication, without long-term current use of insulin (HCC)  Controlled. Continue metformin  mg daily for  - POCT glycosylated hemoglobin (Hb A1C)    2. Essential hypertension  Controlled. Continue amlodipine 5 mg daily, losartan 100 mg daily and atenolol 50 mg daily. 3. Mixed hyperlipidemia  Controlled. Continue Lipitor 40 mg daily. Will check lipid panel next blood draw. 4. Spinal stenosis of lumbar region without neurogenic claudication  Chronic back pain and right hip pain. Patient goes to orthopedic will refer patient to pain management Dr. Roberta Castrejon for which patient has an appointment for next week for possible steroids injection. Meantime continue Tylenol 500 mg 3-4 times a day as needed try to take 2 tablets at night before bed as the pain is worse at night. May take Voltaren gel as needed.       RTC in 2-3 mos    Addendum: Has skin at scrotum need excision next visit    An electronic signature was used to authenticate this note.    --Wily Rodriguez MD on 12/6/2022 at 12:33 PM

## 2022-12-12 RX ORDER — GABAPENTIN 100 MG/1
CAPSULE ORAL
Qty: 30 CAPSULE | Refills: 2 | Status: SHIPPED | OUTPATIENT
Start: 2022-12-12 | End: 2023-01-11

## 2023-01-11 NOTE — PROGRESS NOTES
[facility] ENDOSCOPY AND OUTPATIENT  PRE-PROCEDURE INSTRUCTIONS    Procedure date_01/13/2023________Arrival time__1015__________        Procedure time___1115_________       Screening questions for Pain procedures:  Do you have a current infection? __no_______  Are you currently taking an antibiotic?__no____  Are you taking a blood thinner?__no______    It is not necessary to stop eating or drinking prior to this procedure. We would like you to take your medications for blood pressure as usual.  You may be asked to stop blood thinners such as Coumadin, Plavix, Fragmin, Lovenox, etc., or any anti-inflammatories such as:  Aspirin, Ibuprofen, Advil, Naproxen prior to your procedure. We also ask that you stop any OTC medications that cause additional bleeding    You must make arrangements for a responsible adult to arrive with you and stay in our waiting area during your procedure. They will also need to take you home after your procedure. For your safety you will not be allowed to leave alone or drive yourself home. Also for your safety, it is strongly suggested that someone stay with you the first 24 hours after your procedure. For your comfort, please wear simple loose fitting clothing to the center. Please do not bring valuables. If you have a living will and a durable power of  for healthcare, please bring in a copy.     You will need to bring a photo ID and insurance card    Our goal is to provide you with excellent care so if you have any questions, please contact us at the [facility] at [phone]

## 2023-01-13 ENCOUNTER — HOSPITAL ENCOUNTER (OUTPATIENT)
Age: 78
Setting detail: OUTPATIENT SURGERY
Discharge: HOME | End: 2023-01-13
Attending: ANESTHESIOLOGY | Admitting: ANESTHESIOLOGY
Payer: COMMERCIAL

## 2023-01-13 ENCOUNTER — APPOINTMENT (OUTPATIENT)
Dept: INTERVENTIONAL RADIOLOGY/VASCULAR | Age: 78
End: 2023-01-13
Attending: ANESTHESIOLOGY
Payer: COMMERCIAL

## 2023-01-13 VITALS
SYSTOLIC BLOOD PRESSURE: 130 MMHG | HEIGHT: 65 IN | HEART RATE: 54 BPM | RESPIRATION RATE: 14 BRPM | TEMPERATURE: 97.2 F | OXYGEN SATURATION: 96 % | BODY MASS INDEX: 31.96 KG/M2 | WEIGHT: 191.8 LBS | DIASTOLIC BLOOD PRESSURE: 86 MMHG

## 2023-01-13 PROCEDURE — 2709999900 HC NON-CHARGEABLE SUPPLY: Performed by: ANESTHESIOLOGY

## 2023-01-13 PROCEDURE — 6360000002 HC RX W HCPCS: Performed by: ANESTHESIOLOGY

## 2023-01-13 PROCEDURE — 6360000004 HC RX CONTRAST MEDICATION: Performed by: ANESTHESIOLOGY

## 2023-01-13 PROCEDURE — 3610000054 HC PAIN LEVEL 3 BASE (NON-OR): Performed by: ANESTHESIOLOGY

## 2023-01-13 RX ORDER — METHYLPREDNISOLONE ACETATE 80 MG/ML
INJECTION, SUSPENSION INTRA-ARTICULAR; INTRALESIONAL; INTRAMUSCULAR; SOFT TISSUE
Status: COMPLETED | OUTPATIENT
Start: 2023-01-13 | End: 2023-01-13

## 2023-01-13 ASSESSMENT — PAIN - FUNCTIONAL ASSESSMENT: PAIN_FUNCTIONAL_ASSESSMENT: NONE - DENIES PAIN

## 2023-01-13 NOTE — DISCHARGE INSTRUCTIONS
[facility]   [address] [place], [address] [address]   [address], [address] [place] [address]  [phone]      Patient:  Jeff Burnham  YOB: 1945  Medical Record #:  [id number]   Date:  1/13/2023   Physician:  Domenica Hare MD    Procedure Performed: [unfilled]     Discharge Instructions    Notify Pain Management Services if any of the following occur:    Redness/Swelling at the injection site lasting longer than 2 days  Fever (with redness, swelling, or drainage at the injection site)  Drainage at the injection site  New weakness/ numbness  Severe headache   Loss of bowel/ bladder function    General Instructions: You may experience numbness for several hours following your treatment. You should be cautious using those areas which are numb. Once the numbness wears off, you may apply ice or heat to injection site, if needed. Do not return to work or drive today    Rest today and return to normal activities tomorrow. On average, the steroid takes about 1 week to work and can be up to 2 weeks    You should continue to depend on your primary physician for your medical management of conditions not related to your pain management treatment. Continue to take all your other medications, including blood thinners, as directed by your primary physician unless otherwise instructed. NO changes have been made to your medications. Any changes listed on the discharge are based on patient self reporting. Any EMR warnings regarding drug/drug interactions were dismissed based on current use by the patient, management by prescribing physician and pharmacist and not managed by this physician. Any problem which relates specifically to a treatment or procedure performed today should be directed to the [facility].        [facility]  Division of Interventional Pain Management  [address] [place] [address], [address] [place] [address], [address]  [phone]

## 2023-01-13 NOTE — H&P
Patient:  Zaina Sorenson  YOB: 1945  Medical Record #:  [id number]   Place: [address] [place] [address]  Date:  1/13/2023   Physician:  Coralie Litten, MD    History Obtained From: electronic medical record    HISTORY OF PRESENT ILLNESS    Past Medical History:        Diagnosis Date    Back pain 12/18/2012    Cataract     per Dr Shefali Pepe note 12/13/17    HTN (hypertension) 12/18/2012    Hyperlipidemia 12/18/2012    Impaired fasting glucose 12/18/2012    Osteoarthritis 12/18/2012    Type 2 diabetes mellitus without complication ([facility] [address] 4/41/5691     Past Surgical History:        Procedure Laterality Date    LAPAROSCOPIC APPENDECTOMY  07/14/2017    Laparoscopic appendectomy     Medications Prior to Admission:   No current facility-administered medications on file prior to encounter. Current Outpatient Medications on File Prior to Encounter   Medication Sig Dispense Refill    gabapentin (NEURONTIN) 100 MG capsule TAKE ONE CAPSULE BY MOUTH ONCE NIGHTLY 30 capsule 2    losartan (COZAAR) 100 MG tablet Take 1 tablet by mouth daily 90 tablet 1    atenolol (TENORMIN) 50 MG tablet Take 1 tablet by mouth daily 90 tablet 1    acetaminophen (TYLENOL) 500 MG tablet Take 1 tablet by mouth 4 times daily as needed for Pain 120 tablet 5    diclofenac sodium (VOLTAREN) 1 % GEL Apply 2 g topically 4 times daily 100 g 3    atorvastatin (LIPITOR) 40 MG tablet Take 1 tablet by mouth daily 90 tablet 1    fenofibrate (TRICOR) 145 MG tablet Take 1 tablet by mouth daily 90 tablet 1    amLODIPine (NORVASC) 5 MG tablet Take 1 tablet by mouth in the morning. 90 tablet 1    metFORMIN (GLUCOPHAGE-XR) 750 MG extended release tablet Take 1 tablet by mouth every evening Take with dinner. 90 tablet 1    aspirin 81 MG tablet Take 81 mg by mouth daily.  (Patient not taking: No sig reported)       Allergies:  Lisinopril  Social History     Socioeconomic History    Marital status:      Spouse name: Not on file    Number of children: Not on file    Years of education: Not on file    Highest education level: Not on file   Occupational History    Not on file   Tobacco Use    Smoking status: Never    Smokeless tobacco: Never   Substance and Sexual Activity    Alcohol use: No    Drug use: No    Sexual activity: Not on file   Other Topics Concern    Not on file   Social History Narrative    Not on file     Social Determinants of Health     Financial Resource Strain: Not on file   Food Insecurity: Not on file   Transportation Needs: Not on file   Physical Activity: Sufficiently Active    Days of Exercise per Week: 4 days    Minutes of Exercise per Session: 40 min   Stress: Not on file   Social Connections: Not on file   Intimate Partner Violence: Not on file   Housing Stability: Not on file     Family History   Problem Relation Age of Onset    Stroke Father     Cancer Sister          PHYSICAL EXAM:      Ht 5' 5\" (1.651 m)   Wt 180 lb (81.6 kg)   BMI 29.95 kg/m²  I            ASSESSMENT AND PLAN:    1. Procedure. options, risks and benefits reviewed with patient and expresses understanding.

## 2023-01-13 NOTE — OP NOTE
Patient:  Víctor Pagan  YOB: 1945  Medical Record #:  [id number]   Place: [address] [facility]  Date:  1/13/2023   Physician:  Graciela Miller MD      PRE-PROCEDURE DIAGNOSIS: M54.16    POST-PROCEDURE DIAGNOSIS: M54.16    PROCEDURE:  Midline interlaminar right  L4-5 epidural steroid injection with fluoroscopy and epidurography. BRIEF HISTORY:  The patient presents today to [facility] for a scheduled lumbar epidural steroid injection procedure. The patient was re-evaluated today and is clinically unchanged as compared to my previous evaluation. The patient is clinically stable to proceed with the procedure. PROCEDURE NOTE:  The procedure was again explained to the patient and the previously distributed pre-procedure literature was reviewed. The options, rationale, and benefits of the procedure including pain relief, functional improvement, and increased mobility, as well as the risks of the procedure including but not limited to infection, bleeding, paresthesia, pain, failure to relieve pain, increased pain, headache, allergic reaction, neurologic impairment, local anesthetic, toxicity, and side effects and the potential side effects of corticosteroids were discussed with the patient and informed written consent was obtained from the patient. The patient was positioned in the prone position on the fluoroscopy table. The skin overlying the lumbosacral vertebrae was prepped using Chloraprep and draped in the usual sterile fashion. The L4-5 lumbar intervertebral level was identified using intermittent AP fluoroscopy. The previously identified projection of overlying skin was anesthetized using 2 cc of buffered 1% lidocaine with a 27 gauge needle. A 3.5\" 22g Touhy needle was advanced through a small skin nick in the AP view towards the interlaminar and epidural space. The epidural space was easily identified using loss of resistance to saline.   No difficulty, paresthesia or occurrence of pain was encountered. Careful aspiration was negative for CSF and blood. A total of 1 cc Isovue 300 was injected yielding an epidurogram.    FLUOROSCOPY:  A fluoroscopy unit was utilized to obtain fluoroscopic images for intra-procedural use and assistance. Fluoroscopy was utilized to identify anatomic and radiographic landmarks for the accompanying procedure guidance and not for diagnostic purposes. After negative aspiration 4 cc of therapeutic injectate containing 1 cc of Depo-Medrol 80 mg/cc and 3 ml of lidocaine 1% was injected slowly in aliquots while clinically observing and monitoring the patient with negative aspiration demonstrated between aliquots of injections. At this time no paresthesia or occurrence of pain was present. The needle was then removed, the area was cleansed and a Band-Aid was placed over the injection site. There were no complications. The patient tolerated the procedure well. The procedure was performed using local anesthesia. The patient was transferred by wheelchair with accompaniment to the  Recovery Area and was monitored per protocol. The vital signs remained stable. The patient was discharged in stable condition accompanied by an escort with written instructions after fulfilling the standard discharge criteria. Written follow up instructions were given to the patient.     Estimated Blood Loss: 0ml    Plan:  Follow up in 6-8 weeks      Office: [phone]

## 2023-01-25 NOTE — PROGRESS NOTES
[facility] ENDOSCOPY AND OUTPATIENT  PRE-PROCEDURE INSTRUCTIONS    Procedure date_________Arrival time____________        Procedure time____________       Screening questions for Pain procedures:  Do you have a current infection? _________  Are you currently taking an antibiotic?______  Are you taking a blood thinner?________    It is not necessary to stop eating or drinking prior to this procedure. We would like you to take your medications for blood pressure as usual.  You may be asked to stop blood thinners such as Coumadin, Plavix, Fragmin, Lovenox, etc., or any anti-inflammatories such as:  Aspirin, Ibuprofen, Advil, Naproxen prior to your procedure. We also ask that you stop any OTC medications that cause additional bleeding    You must make arrangements for a responsible adult to arrive with you and stay in our waiting area during your procedure. They will also need to take you home after your procedure. For your safety you will not be allowed to leave alone or drive yourself home. Also for your safety, it is strongly suggested that someone stay with you the first 24 hours after your procedure. For your comfort, please wear simple loose fitting clothing to the center. Please do not bring valuables. If you have a living will and a durable power of  for healthcare, please bring in a copy.     You will need to bring a photo ID and insurance card    Our goal is to provide you with excellent care so if you have any questions, please contact us at the [facility] at [address] [facility]  PRE-PROCEDURE INSTRUCTIONS    Procedure date_01/27/2023________Arrival time__0930__________        Procedure time___1030_________       Screening questions for Pain procedures:  Do you have a current infection? __no_______  Are you currently taking an antibiotic?__no____  Are you taking a blood thinner?___no_____    It is not necessary to stop eating or drinking prior to this procedure. We would like you to take your medications for blood pressure as usual.  You may be asked to stop blood thinners such as Coumadin, Plavix, Fragmin, Lovenox, etc., or any anti-inflammatories such as:  Aspirin, Ibuprofen, Advil, Naproxen prior to your procedure. We also ask that you stop any OTC medications that cause additional bleeding    You must make arrangements for a responsible adult to arrive with you and stay in our waiting area during your procedure. They will also need to take you home after your procedure. For your safety you will not be allowed to leave alone or drive yourself home. Also for your safety, it is strongly suggested that someone stay with you the first 24 hours after your procedure. For your comfort, please wear simple loose fitting clothing to the center. Please do not bring valuables. If you have a living will and a durable power of  for healthcare, please bring in a copy.     You will need to bring a photo ID and insurance card    Our goal is to provide you with excellent care so if you have any questions, please contact us at the [facility] at [phone]

## 2023-01-27 ENCOUNTER — APPOINTMENT (OUTPATIENT)
Dept: INTERVENTIONAL RADIOLOGY/VASCULAR | Age: 78
End: 2023-01-27
Attending: ANESTHESIOLOGY
Payer: COMMERCIAL

## 2023-01-27 ENCOUNTER — HOSPITAL ENCOUNTER (OUTPATIENT)
Age: 78
Setting detail: OUTPATIENT SURGERY
Discharge: HOME | End: 2023-01-27
Attending: ANESTHESIOLOGY | Admitting: ANESTHESIOLOGY
Payer: COMMERCIAL

## 2023-01-27 VITALS
HEIGHT: 65 IN | OXYGEN SATURATION: 98 % | SYSTOLIC BLOOD PRESSURE: 142 MMHG | HEART RATE: 59 BPM | BODY MASS INDEX: 31.65 KG/M2 | TEMPERATURE: 97.1 F | DIASTOLIC BLOOD PRESSURE: 69 MMHG | RESPIRATION RATE: 18 BRPM | WEIGHT: 190 LBS

## 2023-01-27 PROCEDURE — 6360000002 HC RX W HCPCS: Performed by: ANESTHESIOLOGY

## 2023-01-27 PROCEDURE — 3610000054 HC PAIN LEVEL 3 BASE (NON-OR): Performed by: ANESTHESIOLOGY

## 2023-01-27 PROCEDURE — 2709999900 HC NON-CHARGEABLE SUPPLY: Performed by: ANESTHESIOLOGY

## 2023-01-27 PROCEDURE — 2500000003 HC RX 250 WO HCPCS: Performed by: ANESTHESIOLOGY

## 2023-01-27 RX ORDER — LIDOCAINE HYDROCHLORIDE 10 MG/ML
INJECTION, SOLUTION EPIDURAL; INFILTRATION; INTRACAUDAL; PERINEURAL
Status: COMPLETED | OUTPATIENT
Start: 2023-01-27 | End: 2023-01-27

## 2023-01-27 RX ORDER — METHYLPREDNISOLONE ACETATE 80 MG/ML
INJECTION, SUSPENSION INTRA-ARTICULAR; INTRALESIONAL; INTRAMUSCULAR; SOFT TISSUE
Status: COMPLETED | OUTPATIENT
Start: 2023-01-27 | End: 2023-01-27

## 2023-01-27 RX ORDER — BUPIVACAINE HYDROCHLORIDE 5 MG/ML
INJECTION, SOLUTION EPIDURAL; INTRACAUDAL
Status: COMPLETED | OUTPATIENT
Start: 2023-01-27 | End: 2023-01-27

## 2023-01-27 ASSESSMENT — PAIN SCALES - GENERAL
PAINLEVEL_OUTOF10: 0
PAINLEVEL_OUTOF10: 0

## 2023-01-27 ASSESSMENT — PAIN - FUNCTIONAL ASSESSMENT: PAIN_FUNCTIONAL_ASSESSMENT: 0-10

## 2023-01-27 NOTE — H&P
Patient:  Sarah Clark  YOB: 1945  Medical Record #:  [id number]   Place: [address] [place] [address]  Date:  1/27/2023   Physician:  Allison Nicole MD    History Obtained From: electronic medical record    HISTORY OF PRESENT ILLNESS    Past Medical History:        Diagnosis Date    Back pain 12/18/2012    Cataract     per Dr Yary Flores note 12/13/17    HTN (hypertension) 12/18/2012    Hyperlipidemia 12/18/2012    Impaired fasting glucose 12/18/2012    Osteoarthritis 12/18/2012    Type 2 diabetes mellitus without complication ([facility] [address] 1/19/8691     Past Surgical History:        Procedure Laterality Date    LAPAROSCOPIC APPENDECTOMY  07/14/2017    Laparoscopic appendectomy    PAIN MANAGEMENT PROCEDURE Right 1/13/2023    LUMBAR EPIDURAL STEROID INJECTION - RIGHT L4-5 performed by Jerry Garcia MD at [facility]     Medications Prior to Admission:   No current facility-administered medications on file prior to encounter. Current Outpatient Medications on File Prior to Encounter   Medication Sig Dispense Refill    gabapentin (NEURONTIN) 100 MG capsule TAKE ONE CAPSULE BY MOUTH ONCE NIGHTLY 30 capsule 2    losartan (COZAAR) 100 MG tablet Take 1 tablet by mouth daily 90 tablet 1    atenolol (TENORMIN) 50 MG tablet Take 1 tablet by mouth daily 90 tablet 1    acetaminophen (TYLENOL) 500 MG tablet Take 1 tablet by mouth 4 times daily as needed for Pain 120 tablet 5    diclofenac sodium (VOLTAREN) 1 % GEL Apply 2 g topically 4 times daily 100 g 3    atorvastatin (LIPITOR) 40 MG tablet Take 1 tablet by mouth daily 90 tablet 1    fenofibrate (TRICOR) 145 MG tablet Take 1 tablet by mouth daily 90 tablet 1    amLODIPine (NORVASC) 5 MG tablet Take 1 tablet by mouth in the morning. 90 tablet 1    metFORMIN (GLUCOPHAGE-XR) 750 MG extended release tablet Take 1 tablet by mouth every evening Take with dinner. 90 tablet 1    aspirin 81 MG tablet Take 81 mg by mouth daily.  (Patient not taking: No sig reported)       Allergies:  Lisinopril  Social History     Socioeconomic History    Marital status:      Spouse name: Not on file    Number of children: Not on file    Years of education: Not on file    Highest education level: Not on file   Occupational History    Not on file   Tobacco Use    Smoking status: Never    Smokeless tobacco: Never   Substance and Sexual Activity    Alcohol use: No    Drug use: No    Sexual activity: Not on file   Other Topics Concern    Not on file   Social History Narrative    Not on file     Social Determinants of Health     Financial Resource Strain: Not on file   Food Insecurity: Not on file   Transportation Needs: Not on file   Physical Activity: Sufficiently Active    Days of Exercise per Week: 4 days    Minutes of Exercise per Session: 40 min   Stress: Not on file   Social Connections: Not on file   Intimate Partner Violence: Not on file   Housing Stability: Not on file     Family History   Problem Relation Age of Onset    Stroke Father     Cancer Sister          PHYSICAL EXAM:      Ht 5' 5\" (1.651 m)   Wt 180 lb (81.6 kg)   BMI 29.95 kg/m²  I            ASSESSMENT AND PLAN:    1. Procedure. options, risks and benefits reviewed with patient and expresses understanding.

## 2023-01-27 NOTE — OP NOTE
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 BRIEF HISTORY:  The patient presents today to [facility] for a scheduled sciatic nerve block procedure. The patient was re-evaluated today and is clinically unchanged as compared to my previous evaluation. The patient is clinically stable to proceed with the procedure. PROCEDURE NOTE:  The procedure was again explained to the patient and the previously distributed pre-procedure literature was reviewed. The options, rationale, and benefits of the procedure including pain relief, functional improvement, and increased mobility, as well as the risks of the procedure including but not limited to infection, bleeding, paresthesia, pain, failure to relieve pain, increased pain, headache, allergic reaction, neurologic impairment, local anesthetic, toxicity, and side effects and the potential side effects of corticosteroids were discussed with the patient and informed written consent was obtained from the patient. The patient was brought to the fluoroscopy suite and placed in the prone position. The RIGHT sacral and gluteal area was prepped in the usual sterile fashion with Chloraprep and then draped. Intermittent AP fluoroscopy was utilized to localize the radiographic landmarks. A standard perisciatic nerve block approach was used. The sciatic notch, at its superolateral border was localized at its junction with the ilium rostral to the acetabulum and lateral to the SI joint and lateral to the sciatic nerve. It was localized at the radiographic marker of the overlying sciatic nerve between the lateral aspect of the sacrum and the greater trochanter.    The superficial skin projection was anesthetized with buffered lidocaine 1% 2 cc using a 27-gauge needle. A spinal needle was then inserted slowly under direct intermittent AP fluoroscopy towards the ilium and the overlying piriformis muscle. Negative aspiration was re-demonstrated and therapeutic injectate consisting of 6 cc of lidocaine 1%, 1 cc of bupivacaine 0.5% and 1 cc of Depo-Medrol 40 mg/cc was injected without pain, paresthesia, difficulty, or complaints. The needle was removed, the area cleansed, a Band-Aid placed over the injection site. Patient tolerated the procedure well. There were no complications. The patient was transferred, by wheelchair or stretcher, with accompaniment to the recovery area where the vital signs remained stable. There was sensory or motor blockade in the lower extremity. This procedure was done using local anesthesia only. The patient was discharged in stable condition accompanied with an escort after fulfilling standard discharge criteria. FLUOROSCOPY:  A fluoroscopy unit was utilized to obtain fluoroscopic images for intra-procedural use and assistance. Fluoroscopy was utilized to identify anatomic and radiographic landmarks for the accompanying procedure guidance and not for diagnostic purposes.       Estimated Blood Loss: 0ml      Plan:  Follow up in 4-6 weeks      Office: [phone]

## 2023-01-27 NOTE — DISCHARGE INSTRUCTIONS
[facility]   [address] [facility], [address] [place] [address]   [address], [address] [place] [address]  [phone]      Patient:  Carolina Jiménez  YOB: 1945  Medical Record #:  [id number]   Date:  1/27/2023   Physician:  Erasmo Gilbert MD    Procedure Performed: Right Sciatic Nerve Block    Discharge Instructions    Notify Pain Management Services if any of the following occur:    Redness/Swelling at the injection site lasting longer than 2 days  Fever (with redness, swelling, or drainage at the injection site)  Drainage at the injection site  New weakness/ numbness      General Instructions: You may experience numbness for several hours following your treatment. You should be cautious using those areas which are numb. Once the numbness wears off, you may apply ice or heat to injection site, if needed. Do not return to work or drive today    Rest today and return to normal activities tomorrow. On average, the steroid takes about 1 week to work and can be up to 2 weeks    You should continue to depend on your primary physician for your medical management of conditions not related to your pain management treatment. Continue to take all your other medications, including blood thinners, as directed by your primary physician unless otherwise instructed. NO changes have been made to your medications. Any changes listed on the discharge are based on patient self reporting. Any EMR warnings regarding drug/drug interactions were dismissed based on current use by the patient, management by prescribing physician and pharmacist and not managed by this physician. Any problem which relates specifically to a treatment or procedure performed today should be directed to the [facility].        [facility]  Division of Interventional Pain Management  [address] [facility] [address] [facility], [address] [place] [address]  [phone]

## 2023-02-08 RX ORDER — METFORMIN HYDROCHLORIDE 750 MG/1
750 TABLET, EXTENDED RELEASE ORAL
Qty: 90 TABLET | Refills: 1 | Status: SHIPPED | OUTPATIENT
Start: 2023-02-08

## 2023-02-08 RX ORDER — AMLODIPINE BESYLATE 5 MG/1
5 TABLET ORAL DAILY
Qty: 90 TABLET | Refills: 1 | Status: SHIPPED | OUTPATIENT
Start: 2023-02-08

## 2023-02-14 ENCOUNTER — OFFICE VISIT (OUTPATIENT)
Dept: PRIMARY CARE CLINIC | Age: 78
End: 2023-02-14

## 2023-02-14 VITALS
HEART RATE: 62 BPM | RESPIRATION RATE: 18 BRPM | SYSTOLIC BLOOD PRESSURE: 147 MMHG | WEIGHT: 190 LBS | DIASTOLIC BLOOD PRESSURE: 81 MMHG | BODY MASS INDEX: 31.62 KG/M2

## 2023-02-14 DIAGNOSIS — I10 ESSENTIAL HYPERTENSION: ICD-10-CM

## 2023-02-14 DIAGNOSIS — M48.061 SPINAL STENOSIS OF LUMBAR REGION WITHOUT NEUROGENIC CLAUDICATION: ICD-10-CM

## 2023-02-14 DIAGNOSIS — E11.22 TYPE 2 DIABETES MELLITUS WITH STAGE 3A CHRONIC KIDNEY DISEASE, WITH LONG-TERM CURRENT USE OF INSULIN (HCC): ICD-10-CM

## 2023-02-14 DIAGNOSIS — N18.31 STAGE 3A CHRONIC KIDNEY DISEASE (HCC): ICD-10-CM

## 2023-02-14 DIAGNOSIS — N18.31 TYPE 2 DIABETES MELLITUS WITH STAGE 3A CHRONIC KIDNEY DISEASE, WITH LONG-TERM CURRENT USE OF INSULIN (HCC): ICD-10-CM

## 2023-02-14 DIAGNOSIS — L91.8 SKIN TAG: ICD-10-CM

## 2023-02-14 DIAGNOSIS — E78.2 MIXED HYPERLIPIDEMIA: ICD-10-CM

## 2023-02-14 DIAGNOSIS — Z79.4 TYPE 2 DIABETES MELLITUS WITH STAGE 3A CHRONIC KIDNEY DISEASE, WITH LONG-TERM CURRENT USE OF INSULIN (HCC): ICD-10-CM

## 2023-02-14 ASSESSMENT — PATIENT HEALTH QUESTIONNAIRE - PHQ9
SUM OF ALL RESPONSES TO PHQ QUESTIONS 1-9: 0
2. FEELING DOWN, DEPRESSED OR HOPELESS: 0
1. LITTLE INTEREST OR PLEASURE IN DOING THINGS: 0
SUM OF ALL RESPONSES TO PHQ QUESTIONS 1-9: 0
SUM OF ALL RESPONSES TO PHQ9 QUESTIONS 1 & 2: 0

## 2023-02-15 ENCOUNTER — PATIENT MESSAGE (OUTPATIENT)
Dept: PRIMARY CARE CLINIC | Age: 78
End: 2023-02-15

## 2023-02-15 PROBLEM — E11.22 TYPE 2 DIABETES MELLITUS WITH CHRONIC KIDNEY DISEASE (HCC): Status: ACTIVE | Noted: 2023-02-15

## 2023-02-15 RX ORDER — ATENOLOL 50 MG/1
50 TABLET ORAL DAILY
Qty: 90 TABLET | Refills: 1 | Status: SHIPPED | OUTPATIENT
Start: 2023-02-15

## 2023-02-15 RX ORDER — LOSARTAN POTASSIUM 100 MG/1
100 TABLET ORAL DAILY
Qty: 90 TABLET | Refills: 1 | Status: SHIPPED | OUTPATIENT
Start: 2023-02-15

## 2023-02-15 RX ORDER — GABAPENTIN 300 MG/1
300 CAPSULE ORAL DAILY
Qty: 90 CAPSULE | Refills: 1 | Status: SHIPPED | OUTPATIENT
Start: 2023-02-15 | End: 2023-03-17

## 2023-02-15 NOTE — TELEPHONE ENCOUNTER
From: Michael Quiroz  To: Dr. Vivienne Pierce: 2/15/2023 10:11 AM EST  Subject: Skin tag    I was there yesterday (2/14/2023) to remove my skin tag and I didnt know that I have 2 skin tags , Dr. Blaise Martinez removed only 1 skin tag. I want to take out other one because it bothers me. Please let me know when can I go back there to have this done.       Thanks,  Nahed Hull will be out of town 2/21 to 2/28

## 2023-02-19 ASSESSMENT — ENCOUNTER SYMPTOMS
DIARRHEA: 0
ABDOMINAL PAIN: 0
BLOOD IN STOOL: 0
VOICE CHANGE: 0
SHORTNESS OF BREATH: 0
CONSTIPATION: 0
TROUBLE SWALLOWING: 0

## 2023-02-19 NOTE — PROGRESS NOTES
2023     Radha Kolb (:  1945) is a 69 y.o. male, here for evaluation of the following medical concerns:    HPI  Patient presented to the office for follow-up and for removal of his skin tag at the scrotum. He has diabetes fairly controlled takes metformin  mg daily. He has  Pretension controlled with losartan 100 mg daily, atenolol 50 mg daily and amlodipine 5 mg daily. He has hyperlipidemia reported to be compliant with a statin, takes Lipitor 40 mg daily and Tricor 145 mg daily, tolerating medication without side effect. He has a spinal stenosis of the lumbar region and has been experiencing intermittent low back pain, goes to pain management and has a recent epidural steroid injection which helped somewhat. .  He denies headache nausea vomiting. Denies chest pain or shortness of breath. Denies bowel or urinary disturbance. Review of Systems   Constitutional:  Negative for activity change. HENT:  Negative for trouble swallowing and voice change. Eyes:  Negative for visual disturbance. Respiratory:  Negative for shortness of breath. Cardiovascular:  Negative for chest pain and leg swelling. Gastrointestinal:  Negative for abdominal pain, blood in stool, constipation and diarrhea. Genitourinary:  Negative for difficulty urinating, dysuria, frequency, hematuria and scrotal swelling. Musculoskeletal:  Negative for arthralgias and myalgias. Skin:  Negative for rash. Neurological:  Negative for dizziness. Psychiatric/Behavioral:  Negative for behavioral problems. Prior to Visit Medications    Medication Sig Taking? Authorizing Provider   losartan (COZAAR) 100 MG tablet Take 1 tablet by mouth daily Yes Taryn Mohan MD   atenolol (TENORMIN) 50 MG tablet Take 1 tablet by mouth daily Yes Taryn Mohan MD   gabapentin (NEURONTIN) 300 MG capsule Take 1 capsule by mouth daily for 30 days.  Yes Taryn Mohan MD   amLODIPine (NORVASC) 5 MG tablet Take 1 tablet by mouth daily  Jaye Dior MD   metFORMIN (GLUCOPHAGE-XR) 750 MG extended release tablet Take 1 tablet by mouth daily (with breakfast)  Jaye Dior MD   acetaminophen (TYLENOL) 500 MG tablet Take 1 tablet by mouth 4 times daily as needed for Pain  Thomas Quinn MD   diclofenac sodium (VOLTAREN) 1 % GEL Apply 2 g topically 4 times daily  Thomas Quinn MD   atorvastatin (LIPITOR) 40 MG tablet Take 1 tablet by mouth daily  Thomas Quinn MD   fenofibrate (TRICOR) 145 MG tablet Take 1 tablet by mouth daily  Thomas Quinn MD   aspirin 81 MG tablet Take 81 mg by mouth daily. Patient not taking: No sig reported  Historical Provider, MD        Social History     Tobacco Use    Smoking status: Never    Smokeless tobacco: Never   Substance Use Topics    Alcohol use: No        Vitals:    02/14/23 1434   BP: (!) 147/81   Pulse: 62   Resp: 18   Weight: 190 lb (86.2 kg)     Estimated body mass index is 31.62 kg/m² as calculated from the following:    Height as of 1/27/23: 5' 5\" (1.651 m). Weight as of this encounter: 190 lb (86.2 kg). Physical Exam  Constitutional:       Appearance: He is well-developed. HENT:      Head: Normocephalic. Eyes:      Conjunctiva/sclera: Conjunctivae normal.      Pupils: Pupils are equal, round, and reactive to light. Neck:      Thyroid: No thyromegaly. Cardiovascular:      Rate and Rhythm: Normal rate and regular rhythm. Heart sounds: Normal heart sounds. No murmur heard. Pulmonary:      Effort: Pulmonary effort is normal.      Breath sounds: Normal breath sounds. Abdominal:      General: Bowel sounds are normal.      Palpations: Abdomen is soft. There is no mass. Genitourinary:     Penis: Normal.       Prostate: Normal.   Musculoskeletal:         General: Normal range of motion. Cervical back: Normal range of motion and neck supple. Skin:     General: Skin is warm. Findings: No rash.       Comments: Has skin tag at scrotum Neurological:      Mental Status: He is alert. Psychiatric:         Behavior: Behavior normal.       ASSESSMENT/PLAN:  1. Type 2 diabetes mellitus with stage 3a chronic kidney disease, with long-term current use of insulin (HCC)  Controlled. Continue metformin  mg daily. 2. Stage 3a chronic kidney disease ([address]  Stable renal profile. With NSAIDs and other nephrotoxic drugs. 3. Spinal stenosis of lumbar region without neurogenic claudication  He goes to pain specialist.  He has had recent epidural steroid injection which helped somewhat    4. Skin tag  Small skin tag at the scrotum.   Xylocaine was injected to the site and the skin tag was removed by electrocautery, patient tolerated the procedure      RTC in 3 mos    An electronic signature was used to authenticate this note.    --Kati Sue MD on 2/19/2023 at 5:51 PM

## 2023-03-14 RX ORDER — FENOFIBRATE 145 MG/1
145 TABLET, COATED ORAL DAILY
Qty: 90 TABLET | Refills: 1 | Status: SHIPPED | OUTPATIENT
Start: 2023-03-14

## 2023-03-23 ENCOUNTER — TELEPHONE (OUTPATIENT)
Dept: PRIMARY CARE CLINIC | Age: 78
End: 2023-03-23

## 2023-03-23 ENCOUNTER — OFFICE VISIT (OUTPATIENT)
Dept: PRIMARY CARE CLINIC | Age: 78
End: 2023-03-23

## 2023-03-23 VITALS — HEART RATE: 66 BPM | SYSTOLIC BLOOD PRESSURE: 151 MMHG | RESPIRATION RATE: 18 BRPM | DIASTOLIC BLOOD PRESSURE: 78 MMHG

## 2023-03-23 DIAGNOSIS — J00 ACUTE RHINITIS: Primary | ICD-10-CM

## 2023-03-23 DIAGNOSIS — L91.8 SKIN TAG OF PERINEUM IN MALE: ICD-10-CM

## 2023-03-23 RX ORDER — ALBUTEROL SULFATE 90 UG/1
2 AEROSOL, METERED RESPIRATORY (INHALATION) 4 TIMES DAILY PRN
Qty: 54 G | Refills: 1 | Status: SHIPPED | OUTPATIENT
Start: 2023-03-23

## 2023-03-23 ASSESSMENT — ENCOUNTER SYMPTOMS
VOICE CHANGE: 0
CONSTIPATION: 0
DIARRHEA: 0
TROUBLE SWALLOWING: 0
ABDOMINAL PAIN: 0
SHORTNESS OF BREATH: 0
BLOOD IN STOOL: 0

## 2023-03-23 NOTE — LETTER
March 23, 2023       Nora Dec YOB: 1945   [address] Date of Visit:  3/23/2023       To Whom It May Concern: It is my medical opinion that Lora Merritt requires a disability parking placard for the following reasons:  He cannot walk 200 feet without stopping to rest.  Duration of need: 5 years    If you have any questions or concerns, please don't hesitate to call.     Sincerely,        Lora Merritt MD

## 2023-03-23 NOTE — PROGRESS NOTES
3/23/2023     Yoni Rodriguez (:  1945) is a 69 y.o. male, here for evaluation of the following medical concerns:    HPI  Patient presented to the office for excision of the skin tag at the scrotal area. The lesion bother the patient and would like it to be no lymphadenopathy noted she complaining of the wheezing at night when she lies down. He think it might be due to allergy. He has no history of asthma or COPD. He has no cardiac problem She denies chest pain or shortness of breath, denies bowel or urinary disturbance. Review of Systems   Constitutional:  Negative for activity change. HENT:  Negative for trouble swallowing and voice change. Eyes:  Negative for visual disturbance. Respiratory:  Negative for shortness of breath. Cardiovascular:  Negative for chest pain and leg swelling. Gastrointestinal:  Negative for abdominal pain, blood in stool, constipation and diarrhea. Genitourinary:  Negative for difficulty urinating, dysuria, frequency, hematuria and scrotal swelling. Musculoskeletal:  Negative for arthralgias and myalgias. Skin:  Negative for rash. Neurological:  Negative for dizziness. Psychiatric/Behavioral:  Negative for behavioral problems. Prior to Visit Medications    Medication Sig Taking? Authorizing Provider   albuterol sulfate HFA (VENTOLIN HFA) 108 (90 Base) MCG/ACT inhaler Inhale 2 puffs into the lungs 4 times daily as needed for Wheezing Yes Shira Israel MD   fenofibrate (TRICOR) 145 MG tablet Take 1 tablet by mouth daily  Shira Israel MD   losartan (COZAAR) 100 MG tablet Take 1 tablet by mouth daily  Shira Israel MD   atenolol (TENORMIN) 50 MG tablet Take 1 tablet by mouth daily  Shira Israel MD   gabapentin (NEURONTIN) 300 MG capsule Take 1 capsule by mouth daily for 30 days.   Shira Israel MD   amLODIPine (NORVASC) 5 MG tablet Take 1 tablet by mouth daily  Jose Manuel Alba MD   metFORMIN (GLUCOPHAGE-XR) 750 MG extended

## 2023-03-27 RX ORDER — ATORVASTATIN CALCIUM 40 MG/1
40 TABLET, FILM COATED ORAL DAILY
Qty: 90 TABLET | Refills: 1 | Status: SHIPPED | OUTPATIENT
Start: 2023-03-27

## 2023-05-24 ENCOUNTER — TELEPHONE (OUTPATIENT)
Dept: PRIMARY CARE CLINIC | Age: 78
End: 2023-05-24

## 2023-05-24 RX ORDER — METFORMIN HYDROCHLORIDE 750 MG/1
750 TABLET, EXTENDED RELEASE ORAL
Qty: 90 TABLET | Refills: 1 | Status: SHIPPED | OUTPATIENT
Start: 2023-05-24

## 2023-05-24 NOTE — TELEPHONE ENCOUNTER
PT's wife called in requesting a refill for PT's Metformin. Please send to the [address] on [address] [facility].      LOV: 3/23/23  NOV: 8/15/23    Best call back number: [phone]

## 2023-07-11 ENCOUNTER — OFFICE VISIT (OUTPATIENT)
Dept: PRIMARY CARE CLINIC | Age: 78
End: 2023-07-11

## 2023-07-11 VITALS
HEIGHT: 66 IN | HEART RATE: 54 BPM | DIASTOLIC BLOOD PRESSURE: 75 MMHG | RESPIRATION RATE: 18 BRPM | OXYGEN SATURATION: 98 % | BODY MASS INDEX: 30.22 KG/M2 | SYSTOLIC BLOOD PRESSURE: 128 MMHG | WEIGHT: 188 LBS

## 2023-07-11 DIAGNOSIS — M17.12 PRIMARY OSTEOARTHRITIS OF LEFT KNEE: ICD-10-CM

## 2023-07-11 DIAGNOSIS — Z01.818 PRE-OP EVALUATION: Primary | ICD-10-CM

## 2023-07-11 NOTE — PROGRESS NOTES
disease, stage III ([address] ) [877782]    Spinal stenosis of lumbar region without neurogenic claudication    Type 2 diabetes mellitus with chronic kidney disease       Past Medical History:   Diagnosis Date    Back pain 12/18/2012    Cataract     per Dr Rivera Martin note 12/13/17    HTN (hypertension) 12/18/2012    Hyperlipidemia 12/18/2012    Impaired fasting glucose 12/18/2012    Osteoarthritis 12/18/2012    Type 2 diabetes mellitus without complication ([address]) 5/93/2004     Past Surgical History:   Procedure Laterality Date    LAPAROSCOPIC APPENDECTOMY  07/14/2017    Laparoscopic appendectomy    NERVE BLOCK Right 1/27/2023    RIGHT SCIATIC NERVE BLOCK performed by Antonietta Hammans, MD at [address] [place] [address] Dr De La Rosa Right 1/13/2023    LUMBAR EPIDURAL STEROID INJECTION - RIGHT L4-5 performed by Antonietta Hammans, MD at [facility] ([facility] History   Problem Relation Age of Onset    Stroke Father     Cancer Sister      Social History     Socioeconomic History    Marital status:      Spouse name: Not on file    Number of children: Not on file    Years of education: Not on file    Highest education level: Not on file   Occupational History    Not on file   Tobacco Use    Smoking status: Never    Smokeless tobacco: Never   Substance and Sexual Activity    Alcohol use: No    Drug use: No    Sexual activity: Not on file   Other Topics Concern    Not on file   Social History Narrative    Not on file     Social Determinants of Health     Financial Resource Strain: Not on file   Food Insecurity: Not on file   Transportation Needs: Not on file   Physical Activity: Sufficiently Active    Days of Exercise per Week: 4 days    Minutes of Exercise per Session: 40 min   Stress: Not on file   Social Connections: Not on file   Intimate Partner Violence: Not on file   Housing Stability: Not on file       Review of Systems  A comprehensive review of systems was negative except for what was noted in the

## 2023-08-15 RX ORDER — GABAPENTIN 300 MG/1
CAPSULE ORAL
Qty: 90 CAPSULE | Refills: 1 | Status: SHIPPED | OUTPATIENT
Start: 2023-08-15 | End: 2023-09-14

## 2023-08-17 RX ORDER — AMLODIPINE BESYLATE 5 MG/1
5 TABLET ORAL DAILY
Qty: 90 TABLET | Refills: 1 | Status: SHIPPED | OUTPATIENT
Start: 2023-08-17

## 2023-08-31 PROBLEM — M17.12 PRIMARY OSTEOARTHRITIS OF LEFT KNEE: Status: ACTIVE | Noted: 2023-06-05

## 2023-09-18 RX ORDER — FENOFIBRATE 145 MG/1
145 TABLET, COATED ORAL DAILY
Qty: 90 TABLET | Refills: 1 | Status: SHIPPED | OUTPATIENT
Start: 2023-09-18

## 2023-09-28 RX ORDER — ATORVASTATIN CALCIUM 40 MG/1
40 TABLET, FILM COATED ORAL DAILY
Qty: 90 TABLET | Refills: 1 | Status: SHIPPED | OUTPATIENT
Start: 2023-09-28

## 2023-10-02 ENCOUNTER — OFFICE VISIT (OUTPATIENT)
Dept: PRIMARY CARE CLINIC | Age: 78
End: 2023-10-02

## 2023-10-02 VITALS
HEIGHT: 66 IN | HEART RATE: 58 BPM | DIASTOLIC BLOOD PRESSURE: 80 MMHG | WEIGHT: 185.2 LBS | BODY MASS INDEX: 29.77 KG/M2 | SYSTOLIC BLOOD PRESSURE: 138 MMHG | OXYGEN SATURATION: 98 %

## 2023-10-02 DIAGNOSIS — M48.061 SPINAL STENOSIS OF LUMBAR REGION WITHOUT NEUROGENIC CLAUDICATION: ICD-10-CM

## 2023-10-02 DIAGNOSIS — E11.22 TYPE 2 DIABETES MELLITUS WITH STAGE 3A CHRONIC KIDNEY DISEASE, WITHOUT LONG-TERM CURRENT USE OF INSULIN (HCC): ICD-10-CM

## 2023-10-02 DIAGNOSIS — Z12.5 SCREENING PSA (PROSTATE SPECIFIC ANTIGEN): ICD-10-CM

## 2023-10-02 DIAGNOSIS — M17.0 PRIMARY OSTEOARTHRITIS OF BOTH KNEES: ICD-10-CM

## 2023-10-02 DIAGNOSIS — Z23 NEED FOR INFLUENZA VACCINATION: ICD-10-CM

## 2023-10-02 DIAGNOSIS — Z13.29 SCREENING FOR THYROID DISORDER: ICD-10-CM

## 2023-10-02 DIAGNOSIS — N18.31 TYPE 2 DIABETES MELLITUS WITH STAGE 3A CHRONIC KIDNEY DISEASE, WITHOUT LONG-TERM CURRENT USE OF INSULIN (HCC): ICD-10-CM

## 2023-10-02 DIAGNOSIS — I10 ESSENTIAL HYPERTENSION: ICD-10-CM

## 2023-10-02 DIAGNOSIS — E78.2 MIXED HYPERLIPIDEMIA: ICD-10-CM

## 2023-10-02 DIAGNOSIS — N18.31 STAGE 3A CHRONIC KIDNEY DISEASE (HCC): ICD-10-CM

## 2023-10-02 DIAGNOSIS — Z23 NEED FOR SHINGLES VACCINE: ICD-10-CM

## 2023-10-02 DIAGNOSIS — Z00.00 MEDICARE ANNUAL WELLNESS VISIT, SUBSEQUENT: ICD-10-CM

## 2023-10-02 RX ORDER — ZOSTER VACCINE RECOMBINANT, ADJUVANTED 50 MCG/0.5
0.5 KIT INTRAMUSCULAR SEE ADMIN INSTRUCTIONS
Qty: 0.5 ML | Refills: 0 | Status: SHIPPED | OUTPATIENT
Start: 2023-10-02 | End: 2024-03-30

## 2023-10-02 RX ORDER — GABAPENTIN 300 MG/1
300 CAPSULE ORAL DAILY
Qty: 90 CAPSULE | Refills: 1 | Status: SHIPPED | OUTPATIENT
Start: 2023-10-02 | End: 2024-03-30

## 2023-10-02 ASSESSMENT — LIFESTYLE VARIABLES: HOW OFTEN DO YOU HAVE A DRINK CONTAINING ALCOHOL: NEVER

## 2023-10-02 ASSESSMENT — PATIENT HEALTH QUESTIONNAIRE - PHQ9
SUM OF ALL RESPONSES TO PHQ QUESTIONS 1-9: 0
2. FEELING DOWN, DEPRESSED OR HOPELESS: 0
SUM OF ALL RESPONSES TO PHQ QUESTIONS 1-9: 0
SUM OF ALL RESPONSES TO PHQ9 QUESTIONS 1 & 2: 0
1. LITTLE INTEREST OR PLEASURE IN DOING THINGS: 0

## 2023-10-06 DIAGNOSIS — I10 ESSENTIAL HYPERTENSION: ICD-10-CM

## 2023-10-06 DIAGNOSIS — N18.31 TYPE 2 DIABETES MELLITUS WITH STAGE 3A CHRONIC KIDNEY DISEASE, WITHOUT LONG-TERM CURRENT USE OF INSULIN (HCC): ICD-10-CM

## 2023-10-06 DIAGNOSIS — Z12.5 SCREENING PSA (PROSTATE SPECIFIC ANTIGEN): ICD-10-CM

## 2023-10-06 DIAGNOSIS — E11.22 TYPE 2 DIABETES MELLITUS WITH STAGE 3A CHRONIC KIDNEY DISEASE, WITHOUT LONG-TERM CURRENT USE OF INSULIN (HCC): ICD-10-CM

## 2023-10-06 DIAGNOSIS — Z13.29 SCREENING FOR THYROID DISORDER: ICD-10-CM

## 2023-10-06 LAB
ALBUMIN SERPL-MCNC: 4.7 G/DL (ref 3.4–5)
ALBUMIN/GLOB SERPL: 2 {RATIO} (ref 1.1–2.2)
ALP SERPL-CCNC: 46 U/L (ref 40–129)
ALT SERPL-CCNC: 14 U/L (ref 10–40)
ANION GAP SERPL CALCULATED.3IONS-SCNC: 12 MMOL/L (ref 3–16)
AST SERPL-CCNC: 18 U/L (ref 15–37)
BASOPHILS # BLD: 0 K/UL (ref 0–0.2)
BASOPHILS NFR BLD: 0.8 %
BILIRUB SERPL-MCNC: 0.5 MG/DL (ref 0–1)
BUN SERPL-MCNC: 20 MG/DL (ref 7–20)
CALCIUM SERPL-MCNC: 9.5 MG/DL (ref 8.3–10.6)
CHLORIDE SERPL-SCNC: 102 MMOL/L (ref 99–110)
CHOLEST SERPL-MCNC: 115 MG/DL (ref 0–199)
CO2 SERPL-SCNC: 26 MMOL/L (ref 21–32)
CREAT SERPL-MCNC: 1.4 MG/DL (ref 0.8–1.3)
DEPRECATED RDW RBC AUTO: 14.3 % (ref 12.4–15.4)
EOSINOPHIL # BLD: 0.3 K/UL (ref 0–0.6)
EOSINOPHIL NFR BLD: 5.4 %
GFR SERPLBLD CREATININE-BSD FMLA CKD-EPI: 52 ML/MIN/{1.73_M2}
GLUCOSE SERPL-MCNC: 106 MG/DL (ref 70–99)
HCT VFR BLD AUTO: 41 % (ref 40.5–52.5)
HDLC SERPL-MCNC: 50 MG/DL (ref 40–60)
HGB BLD-MCNC: 14.1 G/DL (ref 13.5–17.5)
LDLC SERPL CALC-MCNC: 53 MG/DL
LYMPHOCYTES # BLD: 1.5 K/UL (ref 1–5.1)
LYMPHOCYTES NFR BLD: 29.5 %
MCH RBC QN AUTO: 31.2 PG (ref 26–34)
MCHC RBC AUTO-ENTMCNC: 34.3 G/DL (ref 31–36)
MCV RBC AUTO: 90.8 FL (ref 80–100)
MONOCYTES # BLD: 0.5 K/UL (ref 0–1.3)
MONOCYTES NFR BLD: 10.3 %
NEUTROPHILS # BLD: 2.7 K/UL (ref 1.7–7.7)
NEUTROPHILS NFR BLD: 54 %
PLATELET # BLD AUTO: 281 K/UL (ref 135–450)
PMV BLD AUTO: 7.5 FL (ref 5–10.5)
POTASSIUM SERPL-SCNC: 4.2 MMOL/L (ref 3.5–5.1)
PROT SERPL-MCNC: 7.1 G/DL (ref 6.4–8.2)
PSA SERPL DL<=0.01 NG/ML-MCNC: 1.48 NG/ML (ref 0–4)
RBC # BLD AUTO: 4.52 M/UL (ref 4.2–5.9)
SODIUM SERPL-SCNC: 140 MMOL/L (ref 136–145)
T4 FREE SERPL-MCNC: 1.4 NG/DL (ref 0.9–1.8)
TRIGL SERPL-MCNC: 60 MG/DL (ref 0–150)
TSH SERPL DL<=0.005 MIU/L-ACNC: 6.62 UIU/ML (ref 0.27–4.2)
VLDLC SERPL CALC-MCNC: 12 MG/DL
WBC # BLD AUTO: 4.9 K/UL (ref 4–11)

## 2023-11-16 RX ORDER — LOSARTAN POTASSIUM 100 MG/1
100 TABLET ORAL DAILY
Qty: 90 TABLET | Refills: 1 | Status: SHIPPED | OUTPATIENT
Start: 2023-11-16

## 2023-11-16 RX ORDER — ATENOLOL 50 MG/1
50 TABLET ORAL DAILY
Qty: 90 TABLET | Refills: 1 | Status: SHIPPED | OUTPATIENT
Start: 2023-11-16

## 2024-02-16 RX ORDER — AMLODIPINE BESYLATE 5 MG/1
5 TABLET ORAL DAILY
Qty: 90 TABLET | Refills: 1 | Status: SHIPPED | OUTPATIENT
Start: 2024-02-16

## 2024-02-16 RX ORDER — METFORMIN HYDROCHLORIDE 750 MG/1
750 TABLET, EXTENDED RELEASE ORAL DAILY
Qty: 90 TABLET | Refills: 1 | Status: SHIPPED | OUTPATIENT
Start: 2024-02-16

## 2024-03-22 RX ORDER — FENOFIBRATE 145 MG/1
145 TABLET, COATED ORAL DAILY
Qty: 90 TABLET | Refills: 1 | OUTPATIENT
Start: 2024-03-22

## 2024-04-01 RX ORDER — ATORVASTATIN CALCIUM 40 MG/1
40 TABLET, FILM COATED ORAL DAILY
Qty: 90 TABLET | Refills: 1 | Status: SHIPPED | OUTPATIENT
Start: 2024-04-01

## 2024-04-01 NOTE — TELEPHONE ENCOUNTER
Medication:   Requested Prescriptions     Pending Prescriptions Disp Refills    atorvastatin (LIPITOR) 40 MG tablet [Pharmacy Med Name: ATORVASTATIN 40 MG TABLET] 90 tablet 1     Sig: TAKE 1 TABLET BY MOUTH DAILY        Last Filled:      Patient Phone Number: [phone] (home)     Last appt: 10/2/2023   Next appt: Visit date not found    Last OARRS:       12/12/2022    12:42 PM   RX Monitoring   Periodic Controlled Substance Monitoring Possible medication side effects, risk of tolerance/dependence & alternative treatments discussed.;No signs of potential drug abuse or diversion identified.

## 2024-04-02 ENCOUNTER — OFFICE VISIT (OUTPATIENT)
Dept: PRIMARY CARE CLINIC | Age: 79
End: 2024-04-02

## 2024-04-02 VITALS
BODY MASS INDEX: 31.17 KG/M2 | HEART RATE: 60 BPM | WEIGHT: 190.2 LBS | SYSTOLIC BLOOD PRESSURE: 138 MMHG | DIASTOLIC BLOOD PRESSURE: 82 MMHG | OXYGEN SATURATION: 98 %

## 2024-04-02 DIAGNOSIS — M17.0 PRIMARY OSTEOARTHRITIS OF BOTH KNEES: ICD-10-CM

## 2024-04-02 DIAGNOSIS — E78.2 MIXED HYPERLIPIDEMIA: ICD-10-CM

## 2024-04-02 DIAGNOSIS — N18.31 TYPE 2 DIABETES MELLITUS WITH STAGE 3A CHRONIC KIDNEY DISEASE, WITHOUT LONG-TERM CURRENT USE OF INSULIN (HCC): Primary | ICD-10-CM

## 2024-04-02 DIAGNOSIS — E11.22 TYPE 2 DIABETES MELLITUS WITH STAGE 3A CHRONIC KIDNEY DISEASE, WITHOUT LONG-TERM CURRENT USE OF INSULIN (HCC): Primary | ICD-10-CM

## 2024-04-02 DIAGNOSIS — I10 ESSENTIAL HYPERTENSION: ICD-10-CM

## 2024-04-02 ASSESSMENT — PATIENT HEALTH QUESTIONNAIRE - PHQ9
1. LITTLE INTEREST OR PLEASURE IN DOING THINGS: NOT AT ALL
2. FEELING DOWN, DEPRESSED OR HOPELESS: NOT AT ALL
SUM OF ALL RESPONSES TO PHQ QUESTIONS 1-9: 0
SUM OF ALL RESPONSES TO PHQ QUESTIONS 1-9: 0
SUM OF ALL RESPONSES TO PHQ9 QUESTIONS 1 & 2: 0
SUM OF ALL RESPONSES TO PHQ QUESTIONS 1-9: 0
SUM OF ALL RESPONSES TO PHQ QUESTIONS 1-9: 0

## 2024-04-02 ASSESSMENT — ENCOUNTER SYMPTOMS
CONSTIPATION: 0
ABDOMINAL PAIN: 0
BLOOD IN STOOL: 0
TROUBLE SWALLOWING: 0
SHORTNESS OF BREATH: 0
VOICE CHANGE: 0
DIARRHEA: 0

## 2024-04-02 NOTE — PROGRESS NOTES
General: There is no distension.      Palpations: Abdomen is soft. There is no mass.      Tenderness: There is no guarding or rebound.   Musculoskeletal:         General: Normal range of motion.      Cervical back: Neck supple.   Skin:     General: Skin is warm.      Findings: No rash.   Neurological:      Mental Status: He is alert and oriented to person, place, and time.   Psychiatric:         Behavior: Behavior normal.         ASSESSMENT/PLAN:  1. Type 2 diabetes mellitus with stage 3a chronic kidney disease, without long-term current use of insulin (HCC)  Controlled.  Continue metformin  mg daily.    2. Essential hypertension  Controlled.  Continue amlodipine 5 mg daily, atenolol 50 mg daily and losartan 100 mg daily.    3. Mixed hyperlipidemia  Controlled.  Continue Lipitor 40 mg daily and fenofibrate 140 mg daily    4. Primary osteoarthritis of both knees  Status post left total knee replacement 7/27/2023 by Dr. Serafin Cedeño.  He reported that he will need  surgery on the right knee very soon      RTC in 3 mos    An electronic signature was used to authenticate this note.    --SHIMON KELSEY MD on 4/2/2024 at 1:05 PM

## 2024-04-06 NOTE — PATIENT INSTRUCTIONS
GENERAL OFFICE POLICIES        Telephone Calls: Messages will be answered within 1-2 business days, unless the provider is out of the office.  If it is urgent a covering provider will answer. (this does not include Medication refills).      MyChart:  We recommend all patients sign up for [employer].  Through this portal you can see your lab results, request refills, schedule appointments, pay your bill and send messages to the office.   [employer] messages will be answered within 1-2 business days unless the provider is out of the office.  For urgent matters, please call the office.    Appointments:  All appointments must be scheduled.  We ask all patients to schedule their next follow up appointment before they leave the office to make sure you will be able to be seen before you run out of medications.  24 hours' notice is required to cancel or reschedule an appointment to avoid being marked as a no show.  You may be dismissed from the practice after 3 no shows.      LATE for Appointment: If you are 15 or more minutes late for your appointment, you may be asked to reschedule.    MA/LAB APPTS: Must be scheduled, cannot accept walk in lab visits.  We only draw labs for patients established in our office.  We only do injections for medications ordered by our office.    Acute Sick Visits:  Nothing other than acute complaint will be addressed at this visit.    TRADITIONAL MEDICARE DOES NOT COVER PHYSICALS  MEDICARE WELLNESS VISITS: These are NOT physicals, but the free annual visit offered by Medicare to discuss wellness issues. Medication refills, checkups, etc. will not be addressed during this visit.    Medication Refills: Refills are handled electronically; please contact your pharmacy for refills even if current refills have been exhausted. If you are on a controlled medication, you will be referred to a specialist (pain specialist, psychiatry, etc).     Forms: There is a $35 fee to fill out FMLA/Disability paperwork,

## 2024-05-16 RX ORDER — ATENOLOL 50 MG/1
50 TABLET ORAL DAILY
Qty: 90 TABLET | Refills: 1 | Status: SHIPPED | OUTPATIENT
Start: 2024-05-16

## 2024-05-16 RX ORDER — LOSARTAN POTASSIUM 100 MG/1
100 TABLET ORAL DAILY
Qty: 90 TABLET | Refills: 1 | Status: SHIPPED | OUTPATIENT
Start: 2024-05-16

## 2024-05-16 NOTE — TELEPHONE ENCOUNTER
Medication:   Requested Prescriptions     Pending Prescriptions Disp Refills    losartan (COZAAR) 100 MG tablet [Pharmacy Med Name: LOSARTAN POTASSIUM 100 MG TAB] 90 tablet 1     Sig: TAKE 1 TABLET BY MOUTH DAILY    atenolol (TENORMIN) 50 MG tablet [Pharmacy Med Name: ATENOLOL 50 MG TABLET] 90 tablet 1     Sig: TAKE 1 TABLET BY MOUTH DAILY        Last Filled:      Patient Phone Number: [phone] (home)     Last appt: 4/2/2024   Next appt: 10/1/2024    Last OARRS:       12/12/2022    12:42 PM   RX Monitoring   Periodic Controlled Substance Monitoring Possible medication side effects, risk of tolerance/dependence & alternative treatments discussed.;No signs of potential drug abuse or diversion identified.

## 2024-06-04 RX ORDER — FENOFIBRATE 145 MG/1
145 TABLET, COATED ORAL DAILY
Qty: 90 TABLET | Refills: 1 | Status: SHIPPED | OUTPATIENT
Start: 2024-06-04

## 2024-08-12 ENCOUNTER — OFFICE VISIT (OUTPATIENT)
Dept: PRIMARY CARE CLINIC | Age: 79
End: 2024-08-12
Payer: COMMERCIAL

## 2024-08-12 VITALS
WEIGHT: 192.6 LBS | DIASTOLIC BLOOD PRESSURE: 88 MMHG | BODY MASS INDEX: 31.56 KG/M2 | OXYGEN SATURATION: 96 % | SYSTOLIC BLOOD PRESSURE: 174 MMHG | HEART RATE: 54 BPM | RESPIRATION RATE: 18 BRPM

## 2024-08-12 DIAGNOSIS — Z01.818 PREOP EXAMINATION: Primary | ICD-10-CM

## 2024-08-12 DIAGNOSIS — N18.31 TYPE 2 DIABETES MELLITUS WITH STAGE 3A CHRONIC KIDNEY DISEASE, WITHOUT LONG-TERM CURRENT USE OF INSULIN (HCC): ICD-10-CM

## 2024-08-12 DIAGNOSIS — I10 ESSENTIAL HYPERTENSION: ICD-10-CM

## 2024-08-12 DIAGNOSIS — E11.22 TYPE 2 DIABETES MELLITUS WITH STAGE 3A CHRONIC KIDNEY DISEASE, WITHOUT LONG-TERM CURRENT USE OF INSULIN (HCC): ICD-10-CM

## 2024-08-12 DIAGNOSIS — N18.31 STAGE 3A CHRONIC KIDNEY DISEASE (HCC): ICD-10-CM

## 2024-08-12 DIAGNOSIS — M17.11 PRIMARY OSTEOARTHRITIS OF RIGHT KNEE: ICD-10-CM

## 2024-08-12 PROCEDURE — 1123F ACP DISCUSS/DSCN MKR DOCD: CPT | Performed by: FAMILY MEDICINE

## 2024-08-12 PROCEDURE — 3077F SYST BP >= 140 MM HG: CPT | Performed by: FAMILY MEDICINE

## 2024-08-12 PROCEDURE — 3044F HG A1C LEVEL LT 7.0%: CPT | Performed by: FAMILY MEDICINE

## 2024-08-12 PROCEDURE — 99214 OFFICE O/P EST MOD 30 MIN: CPT | Performed by: FAMILY MEDICINE

## 2024-08-12 PROCEDURE — 3079F DIAST BP 80-89 MM HG: CPT | Performed by: FAMILY MEDICINE

## 2024-08-12 SDOH — ECONOMIC STABILITY: FOOD INSECURITY: WITHIN THE PAST 12 MONTHS, THE FOOD YOU BOUGHT JUST DIDN'T LAST AND YOU DIDN'T HAVE MONEY TO GET MORE.: NEVER TRUE

## 2024-08-12 SDOH — ECONOMIC STABILITY: INCOME INSECURITY: HOW HARD IS IT FOR YOU TO PAY FOR THE VERY BASICS LIKE FOOD, HOUSING, MEDICAL CARE, AND HEATING?: NOT HARD AT ALL

## 2024-08-12 SDOH — ECONOMIC STABILITY: FOOD INSECURITY: WITHIN THE PAST 12 MONTHS, YOU WORRIED THAT YOUR FOOD WOULD RUN OUT BEFORE YOU GOT MONEY TO BUY MORE.: NEVER TRUE

## 2024-08-12 NOTE — PROGRESS NOTES
well-nourished. No distress.   HENT:   Head: Normocephalic and atraumatic.   Mouth/Throat: Uvula is midline, oropharynx is clear and moist and mucous membranes are normal.   Eyes: Conjunctivae and EOM are normal. Pupils are equal, round, and reactive to light.   Neck: Trachea normal and normal range of motion. Neck supple. No JVD present. Carotid bruit is not present. No mass and no thyromegaly present.   Cardiovascular: Normal rate, regular rhythm, normal heart sounds and intact distal pulses.  Exam reveals no gallop and no friction rub.  No murmur heard.  Pulmonary/Chest: Effort normal and breath sounds normal. No respiratory distress. He has no wheezes. He has no rales.   Abdominal: Soft. Normal aorta and bowel sounds are normal. He exhibits no distension and no mass. There is no hepatosplenomegaly. No tenderness.   Musculoskeletal: He exhibits no edema and no tenderness.   Neurological: He is alert and oriented to person, place, and time. He has normal strength. No cranial nerve deficit or sensory deficit. Coordination and gait normal.   Skin: Skin is warm and dry. No rash noted. No erythema.   Psychiatric: He has a normal mood and affect. His behavior is normal.     EKG Interpretation:  normal EKG, normal sinus rhythm, unchanged from previous tracings.    Lab : blood test done at [facility] 8/6/24        Assessment:       79 y.o. patient with planned surgery as above.    Known risk factors for perioperative complications: Hypertension, Renal dysfunction        Plan:     1. Preoperative workup as follows: ECG, hemoglobin, hematocrit, electrolytes, creatinine, urinalysis (urinary tract instrumentation planned)  2. Change in medication regimen before surgery: Take amlodipine and  atenolol on morning of surgery with sip of water, and hold all other medications until after surgery, Discontinue ASA 7 days before surgery  3. Prophylaxis for cardiac events with perioperative beta-blockers: Currently taking

## 2024-08-17 RX ORDER — AMLODIPINE BESYLATE 5 MG/1
5 TABLET ORAL DAILY
Qty: 90 TABLET | Refills: 1 | Status: SHIPPED | OUTPATIENT
Start: 2024-08-17

## 2024-08-17 RX ORDER — METFORMIN HYDROCHLORIDE 750 MG/1
750 TABLET, EXTENDED RELEASE ORAL DAILY
Qty: 90 TABLET | Refills: 1 | Status: SHIPPED | OUTPATIENT
Start: 2024-08-17

## 2024-10-03 RX ORDER — ATORVASTATIN CALCIUM 40 MG/1
40 TABLET, FILM COATED ORAL DAILY
Qty: 90 TABLET | Refills: 1 | Status: SHIPPED | OUTPATIENT
Start: 2024-10-03

## 2024-10-07 SDOH — HEALTH STABILITY: PHYSICAL HEALTH: ON AVERAGE, HOW MANY DAYS PER WEEK DO YOU ENGAGE IN MODERATE TO STRENUOUS EXERCISE (LIKE A BRISK WALK)?: 3 DAYS

## 2024-10-07 SDOH — HEALTH STABILITY: PHYSICAL HEALTH: ON AVERAGE, HOW MANY MINUTES DO YOU ENGAGE IN EXERCISE AT THIS LEVEL?: 30 MIN

## 2024-10-07 ASSESSMENT — LIFESTYLE VARIABLES
HOW MANY STANDARD DRINKS CONTAINING ALCOHOL DO YOU HAVE ON A TYPICAL DAY: 0
HOW MANY STANDARD DRINKS CONTAINING ALCOHOL DO YOU HAVE ON A TYPICAL DAY: PATIENT DOES NOT DRINK
HOW OFTEN DO YOU HAVE A DRINK CONTAINING ALCOHOL: NEVER
HOW OFTEN DO YOU HAVE SIX OR MORE DRINKS ON ONE OCCASION: 1
HOW OFTEN DO YOU HAVE A DRINK CONTAINING ALCOHOL: 1

## 2024-10-07 ASSESSMENT — PATIENT HEALTH QUESTIONNAIRE - PHQ9
SUM OF ALL RESPONSES TO PHQ QUESTIONS 1-9: 0
1. LITTLE INTEREST OR PLEASURE IN DOING THINGS: NOT AT ALL
SUM OF ALL RESPONSES TO PHQ9 QUESTIONS 1 & 2: 0
SUM OF ALL RESPONSES TO PHQ QUESTIONS 1-9: 0
SUM OF ALL RESPONSES TO PHQ QUESTIONS 1-9: 0
2. FEELING DOWN, DEPRESSED OR HOPELESS: NOT AT ALL
SUM OF ALL RESPONSES TO PHQ QUESTIONS 1-9: 0

## 2024-10-09 ENCOUNTER — OFFICE VISIT (OUTPATIENT)
Dept: PRIMARY CARE CLINIC | Age: 79
End: 2024-10-09

## 2024-10-09 ENCOUNTER — TELEPHONE (OUTPATIENT)
Dept: PRIMARY CARE CLINIC | Age: 79
End: 2024-10-09

## 2024-10-09 VITALS
DIASTOLIC BLOOD PRESSURE: 85 MMHG | BODY MASS INDEX: 32.15 KG/M2 | WEIGHT: 193 LBS | OXYGEN SATURATION: 98 % | HEIGHT: 65 IN | HEART RATE: 72 BPM | RESPIRATION RATE: 18 BRPM | SYSTOLIC BLOOD PRESSURE: 157 MMHG

## 2024-10-09 DIAGNOSIS — Z23 NEED FOR INFLUENZA VACCINATION: ICD-10-CM

## 2024-10-09 DIAGNOSIS — I10 ESSENTIAL HYPERTENSION: ICD-10-CM

## 2024-10-09 DIAGNOSIS — I10 ESSENTIAL HYPERTENSION: Primary | ICD-10-CM

## 2024-10-09 DIAGNOSIS — E11.22 TYPE 2 DIABETES MELLITUS WITH STAGE 3A CHRONIC KIDNEY DISEASE, WITHOUT LONG-TERM CURRENT USE OF INSULIN (HCC): ICD-10-CM

## 2024-10-09 DIAGNOSIS — E78.2 MIXED HYPERLIPIDEMIA: ICD-10-CM

## 2024-10-09 DIAGNOSIS — M17.0 PRIMARY OSTEOARTHRITIS OF BOTH KNEES: ICD-10-CM

## 2024-10-09 DIAGNOSIS — N18.31 STAGE 3A CHRONIC KIDNEY DISEASE (HCC): ICD-10-CM

## 2024-10-09 DIAGNOSIS — E03.8 SUBCLINICAL HYPOTHYROIDISM: ICD-10-CM

## 2024-10-09 DIAGNOSIS — N18.31 TYPE 2 DIABETES MELLITUS WITH STAGE 3A CHRONIC KIDNEY DISEASE, WITHOUT LONG-TERM CURRENT USE OF INSULIN (HCC): ICD-10-CM

## 2024-10-09 DIAGNOSIS — Z00.00 MEDICARE ANNUAL WELLNESS VISIT, SUBSEQUENT: ICD-10-CM

## 2024-10-09 LAB
ALBUMIN SERPL-MCNC: 4.5 G/DL (ref 3.4–5)
ALBUMIN/GLOB SERPL: 1.8 {RATIO} (ref 1.1–2.2)
ALP SERPL-CCNC: 57 U/L (ref 40–129)
ALT SERPL-CCNC: 19 U/L (ref 10–40)
ANION GAP SERPL CALCULATED.3IONS-SCNC: 11 MMOL/L (ref 3–16)
AST SERPL-CCNC: 21 U/L (ref 15–37)
BASOPHILS # BLD: 0.1 K/UL (ref 0–0.2)
BASOPHILS NFR BLD: 1.1 %
BILIRUB SERPL-MCNC: 0.6 MG/DL (ref 0–1)
BUN SERPL-MCNC: 16 MG/DL (ref 7–20)
CALCIUM SERPL-MCNC: 10.2 MG/DL (ref 8.3–10.6)
CHLORIDE SERPL-SCNC: 99 MMOL/L (ref 99–110)
CHOLEST SERPL-MCNC: 145 MG/DL (ref 0–199)
CO2 SERPL-SCNC: 28 MMOL/L (ref 21–32)
CREAT SERPL-MCNC: 1.3 MG/DL (ref 0.8–1.3)
DEPRECATED RDW RBC AUTO: 14.5 % (ref 12.4–15.4)
EOSINOPHIL # BLD: 0.2 K/UL (ref 0–0.6)
EOSINOPHIL NFR BLD: 3.2 %
GFR SERPLBLD CREATININE-BSD FMLA CKD-EPI: 56 ML/MIN/{1.73_M2}
GLUCOSE P FAST SERPL-MCNC: 106 MG/DL (ref 70–99)
HCT VFR BLD AUTO: 42.8 % (ref 40.5–52.5)
HDLC SERPL-MCNC: 52 MG/DL (ref 40–60)
HGB BLD-MCNC: 14.1 G/DL (ref 13.5–17.5)
LDL CHOLESTEROL: 70 MG/DL
LYMPHOCYTES # BLD: 0.8 K/UL (ref 1–5.1)
LYMPHOCYTES NFR BLD: 17.4 %
MCH RBC QN AUTO: 30.3 PG (ref 26–34)
MCHC RBC AUTO-ENTMCNC: 32.9 G/DL (ref 31–36)
MCV RBC AUTO: 92.2 FL (ref 80–100)
MONOCYTES # BLD: 0.5 K/UL (ref 0–1.3)
MONOCYTES NFR BLD: 11.2 %
NEUTROPHILS # BLD: 3.2 K/UL (ref 1.7–7.7)
NEUTROPHILS NFR BLD: 67.1 %
PLATELET # BLD AUTO: 319 K/UL (ref 135–450)
PMV BLD AUTO: 7.4 FL (ref 5–10.5)
POTASSIUM SERPL-SCNC: 4.5 MMOL/L (ref 3.5–5.1)
PROT SERPL-MCNC: 7 G/DL (ref 6.4–8.2)
RBC # BLD AUTO: 4.64 M/UL (ref 4.2–5.9)
SODIUM SERPL-SCNC: 138 MMOL/L (ref 136–145)
T4 FREE SERPL-MCNC: 1.3 NG/DL (ref 0.9–1.8)
TRIGL SERPL-MCNC: 113 MG/DL (ref 0–150)
TSH SERPL DL<=0.005 MIU/L-ACNC: 3.57 UIU/ML (ref 0.27–4.2)
VLDLC SERPL CALC-MCNC: 23 MG/DL
WBC # BLD AUTO: 4.8 K/UL (ref 4–11)

## 2024-10-09 RX ORDER — ATENOLOL 100 MG/1
100 TABLET ORAL DAILY
Qty: 90 TABLET | Refills: 1 | Status: SHIPPED | OUTPATIENT
Start: 2024-10-09

## 2024-10-09 RX ORDER — GABAPENTIN 300 MG/1
300 CAPSULE ORAL DAILY
Qty: 90 CAPSULE | Refills: 1 | Status: SHIPPED | OUTPATIENT
Start: 2024-10-09 | End: 2024-11-08

## 2024-10-09 NOTE — PATIENT INSTRUCTIONS
Use less salt when you cook and eat. This helps lower your blood pressure. Taste food before salting. Add only a little salt when you think you need it. With time, your taste buds will adjust to less salt.     Eat fewer snack items, fast foods, canned soups, and other high-salt, high-fat, processed foods.     Read food labels and try to avoid saturated and trans fats. They increase your risk of heart disease by raising cholesterol levels.     Limit the amount of solid fat--butter, margarine, and shortening--you eat. Use olive, peanut, or canola oil when you cook. Bake, broil, and steam foods instead of frying them.     Eat a variety of fruit and vegetables every day. Dark green, deep orange, red, or yellow fruits and vegetables are especially good for you. Examples include spinach, carrots, peaches, and berries.     Foods high in fiber can reduce your cholesterol and provide important vitamins and minerals. High-fiber foods include whole-grain cereals and breads, oatmeal, beans, brown rice, citrus fruits, and apples.     Eat lean proteins. Heart-healthy proteins include seafood, lean meats and poultry, eggs, beans, peas, nuts, seeds, and soy products.     Limit drinks and foods with added sugar. These include candy, desserts, and soda pop.   Heart-healthy lifestyle    If your doctor recommends it, get more exercise. For many people, walking is a good choice. Or you may want to swim, bike, or do other activities. Bit by bit, increase the time you're active every day. Try for at least 30 minutes on most days of the week.     Try to quit or cut back on using tobacco and other nicotine products. This includes smoking and vaping. If you need help quitting, talk to your doctor about stop-smoking programs and medicines. These can increase your chances of quitting for good. Quitting is one of the most important things you can do to protect your heart. It is never too late to quit. Try to avoid secondhand smoke too.     Stay

## 2024-10-09 NOTE — PROGRESS NOTES
Medicare Annual Wellness Visit    Edwin Cui is here for Medicare AWV    Assessment & Plan   Essential hypertension  -Controlled.  Continue amlodipine 5 mg daily, atenolol 50 mg daily and losartan 100 mg daily.    Mixed hyperlipidemia  -Controlled.  Continue Lipitor 40 mg daily and fenofibrate 145 mg daily    Type 2 diabetes mellitus with stage 3a chronic kidney disease, without long-term current use of insulin (HCC)  -Controlled.  Continue metformin  mg daily    Stage 3a chronic kidney disease (HCC)  -Stable renal profile.  Avoid NSAIDs and other nephrotoxic drugs.  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  He has a left total knee replacement in 2023 also by the same orthopedic Dr. Serafin Cedeño at [facility].  He did well postop    Subclinical hypothyroidism  -Will continue to monitor thyroid profile.  He is not on thyroid replacement at this time.    Need for influenza vaccination  -     Influenza, FLUAD Trivalent, (age 65 y+), IM, Preservative Free, 0.5mL    Medicare annual wellness visit, subsequent  -He lives with his wife.  He is able to do basic activities of daily living.  He has no Alzheimer's dementia    Recommendations for Preventive Services Due: see orders and patient instructions/AVS.  Recommended screening schedule for the next 5-10 years is provided to the patient in written form: see Patient Instructions/AVS.     Return in 1 year (on 10/9/2025) for Medicare AWV.     Subjective   Patient is 79 years old male presented to the office for Medicare wellness visit and a regular follow-up.  He has osteoarthritis on both knees and has recent right total knee replacement August 2024.  He also has left total knee replacement a year ago 2023 by the same orthopedic Dr. Serafin Cedeño at [facility] and did well postop.  He has chronic kidney disease stage III a and would like to have renal profile rechecked.  He is aware

## 2024-11-14 RX ORDER — LOSARTAN POTASSIUM 100 MG/1
100 TABLET ORAL DAILY
Qty: 90 TABLET | Refills: 1 | Status: SHIPPED | OUTPATIENT
Start: 2024-11-14

## 2024-12-05 RX ORDER — FENOFIBRATE 145 MG/1
145 TABLET, COATED ORAL DAILY
Qty: 90 TABLET | Refills: 1 | Status: SHIPPED | OUTPATIENT
Start: 2024-12-05

## 2025-02-12 RX ORDER — AMLODIPINE BESYLATE 5 MG/1
5 TABLET ORAL DAILY
Qty: 90 TABLET | Refills: 1 | Status: SHIPPED | OUTPATIENT
Start: 2025-02-12

## 2025-02-12 RX ORDER — METFORMIN HYDROCHLORIDE 750 MG/1
750 TABLET, EXTENDED RELEASE ORAL DAILY
Qty: 90 TABLET | Refills: 1 | Status: SHIPPED | OUTPATIENT
Start: 2025-02-12

## 2025-04-04 RX ORDER — ATORVASTATIN CALCIUM 40 MG/1
40 TABLET, FILM COATED ORAL DAILY
Qty: 90 TABLET | Refills: 1 | Status: SHIPPED | OUTPATIENT
Start: 2025-04-04

## 2025-04-04 RX ORDER — ATENOLOL 100 MG/1
100 TABLET ORAL DAILY
Qty: 90 TABLET | Refills: 1 | Status: SHIPPED | OUTPATIENT
Start: 2025-04-04

## 2025-04-07 SDOH — ECONOMIC STABILITY: INCOME INSECURITY: IN THE LAST 12 MONTHS, WAS THERE A TIME WHEN YOU WERE NOT ABLE TO PAY THE MORTGAGE OR RENT ON TIME?: NO

## 2025-04-07 SDOH — ECONOMIC STABILITY: FOOD INSECURITY: WITHIN THE PAST 12 MONTHS, THE FOOD YOU BOUGHT JUST DIDN'T LAST AND YOU DIDN'T HAVE MONEY TO GET MORE.: NEVER TRUE

## 2025-04-07 SDOH — ECONOMIC STABILITY: FOOD INSECURITY: WITHIN THE PAST 12 MONTHS, YOU WORRIED THAT YOUR FOOD WOULD RUN OUT BEFORE YOU GOT MONEY TO BUY MORE.: NEVER TRUE

## 2025-04-07 ASSESSMENT — PATIENT HEALTH QUESTIONNAIRE - PHQ9
SUM OF ALL RESPONSES TO PHQ QUESTIONS 1-9: 0
SUM OF ALL RESPONSES TO PHQ QUESTIONS 1-9: 0
2. FEELING DOWN, DEPRESSED OR HOPELESS: NOT AT ALL
SUM OF ALL RESPONSES TO PHQ QUESTIONS 1-9: 0
1. LITTLE INTEREST OR PLEASURE IN DOING THINGS: NOT AT ALL
SUM OF ALL RESPONSES TO PHQ9 QUESTIONS 1 & 2: 0
1. LITTLE INTEREST OR PLEASURE IN DOING THINGS: NOT AT ALL
SUM OF ALL RESPONSES TO PHQ QUESTIONS 1-9: 0
2. FEELING DOWN, DEPRESSED OR HOPELESS: NOT AT ALL

## 2025-04-09 ENCOUNTER — OFFICE VISIT (OUTPATIENT)
Dept: PRIMARY CARE CLINIC | Age: 80
End: 2025-04-09
Payer: COMMERCIAL

## 2025-04-09 VITALS
SYSTOLIC BLOOD PRESSURE: 130 MMHG | OXYGEN SATURATION: 96 % | RESPIRATION RATE: 18 BRPM | DIASTOLIC BLOOD PRESSURE: 75 MMHG | HEART RATE: 61 BPM | WEIGHT: 191.8 LBS | BODY MASS INDEX: 31.92 KG/M2

## 2025-04-09 DIAGNOSIS — E03.8 SUBCLINICAL HYPOTHYROIDISM: ICD-10-CM

## 2025-04-09 DIAGNOSIS — I10 ESSENTIAL HYPERTENSION: Primary | ICD-10-CM

## 2025-04-09 DIAGNOSIS — N18.31 TYPE 2 DIABETES MELLITUS WITH STAGE 3A CHRONIC KIDNEY DISEASE, WITHOUT LONG-TERM CURRENT USE OF INSULIN (HCC): ICD-10-CM

## 2025-04-09 DIAGNOSIS — E78.2 MIXED HYPERLIPIDEMIA: ICD-10-CM

## 2025-04-09 DIAGNOSIS — N18.31 STAGE 3A CHRONIC KIDNEY DISEASE (HCC): ICD-10-CM

## 2025-04-09 DIAGNOSIS — E11.22 TYPE 2 DIABETES MELLITUS WITH STAGE 3A CHRONIC KIDNEY DISEASE, WITHOUT LONG-TERM CURRENT USE OF INSULIN (HCC): ICD-10-CM

## 2025-04-09 LAB
CREAT UR-MCNC: 269 MG/DL (ref 39–259)
HBA1C MFR BLD: 6.2 %
MICROALBUMIN UR DL<=1MG/L-MCNC: 7.83 MG/DL
MICROALBUMIN/CREAT UR: 29.1 MG/G (ref 0–30)

## 2025-04-09 PROCEDURE — 1123F ACP DISCUSS/DSCN MKR DOCD: CPT | Performed by: FAMILY MEDICINE

## 2025-04-09 PROCEDURE — 83036 HEMOGLOBIN GLYCOSYLATED A1C: CPT | Performed by: FAMILY MEDICINE

## 2025-04-09 PROCEDURE — 3075F SYST BP GE 130 - 139MM HG: CPT | Performed by: FAMILY MEDICINE

## 2025-04-09 PROCEDURE — 3078F DIAST BP <80 MM HG: CPT | Performed by: FAMILY MEDICINE

## 2025-04-09 PROCEDURE — 99214 OFFICE O/P EST MOD 30 MIN: CPT | Performed by: FAMILY MEDICINE

## 2025-04-09 RX ORDER — ATORVASTATIN CALCIUM 80 MG/1
80 TABLET, FILM COATED ORAL DAILY
Qty: 90 TABLET | Refills: 1 | Status: SHIPPED | OUTPATIENT
Start: 2025-04-09

## 2025-04-09 ASSESSMENT — ENCOUNTER SYMPTOMS
SHORTNESS OF BREATH: 0
VOICE CHANGE: 0
ABDOMINAL PAIN: 0
TROUBLE SWALLOWING: 0
BLOOD IN STOOL: 0
DIARRHEA: 0
CONSTIPATION: 0

## 2025-04-09 NOTE — PROGRESS NOTES
2025     Edwin Cui (:  1945) is a 79 y.o. male, here for evaluation of the following medical concerns:    Diabetes  Pertinent negatives for hypoglycemia include no dizziness. Pertinent negatives for diabetes include no chest pain.     Patient is 79 years old male medical history significant for hypertension, hyperlipidemia, diabetes, chronic kidney disease, osteoarthritis of both knees status post bilateral knee replacement.  He presented to the office for regular follow-up.  Blood pressure is controlled with amlodipine, atenolol and losartan.  He has hyperlipidemia and takes Lipitor 40 and fenofibrate.  Diabetes controlled with metformin  mg daily, HbA1c today 6.2%, denies hypoglycemic episode.    Review of Systems   Constitutional:  Negative for activity change.   HENT:  Negative for trouble swallowing and voice change.    Eyes:  Negative for visual disturbance.   Respiratory:  Negative for shortness of breath.    Cardiovascular:  Negative for chest pain and leg swelling.   Gastrointestinal:  Negative for abdominal pain, blood in stool, constipation and diarrhea.   Genitourinary:  Negative for difficulty urinating, dysuria, frequency, hematuria and scrotal swelling.   Musculoskeletal:  Negative for arthralgias and myalgias.   Skin:  Negative for rash.   Neurological:  Negative for dizziness.   Psychiatric/Behavioral:  Negative for behavioral problems.        Prior to Visit Medications    Medication Sig Taking? Authorizing Provider   atorvastatin (LIPITOR) 80 MG tablet Take 1 tablet by mouth daily Yes Perry Christina MD   atenolol (TENORMIN) 100 MG tablet Take 1 tablet by mouth daily  Perry Christina MD   metFORMIN (GLUCOPHAGE-XR) 750 MG extended release tablet Take 1 tablet by mouth daily  Perry Christina MD   amLODIPine (NORVASC) 5 MG tablet Take 1 tablet by mouth daily  Perry Christina MD   fenofibrate (TRICOR) 145 MG tablet Take 1 tablet by mouth daily  Perry Christina MD

## 2025-04-09 NOTE — PROGRESS NOTES
The 10-year ASCVD risk score (Mary RUSSO, et al., 2019) is: 52.3%    Values used to calculate the score:      Age: 79 years      Sex: Male      Is Non- : No      Diabetic: Yes      Tobacco smoker: No      Systolic Blood Pressure: 130 mmHg      Is BP treated: Yes      HDL Cholesterol: 52 mg/dL      Total Cholesterol: 145 mg/dL

## 2025-06-19 RX ORDER — FENOFIBRATE 145 MG/1
145 TABLET, FILM COATED ORAL DAILY
Qty: 90 TABLET | Refills: 1 | Status: SHIPPED | OUTPATIENT
Start: 2025-06-19

## 2025-06-19 NOTE — TELEPHONE ENCOUNTER
Medication:   Requested Prescriptions     Pending Prescriptions Disp Refills    fenofibrate (TRICOR) 145 MG tablet [Pharmacy Med Name: FENOFIBRATE 145 MG TABLET] 90 tablet 1     Sig: TAKE 1 TABLET BY MOUTH DAILY        Last Filled:  [unfilled]    Patient Phone Number: [phone] (home)     Last appt: 4/9/2025   Next appt: 10/9/2025    Last OARRS:       12/12/2022    12:42 PM   RX Monitoring   Periodic Controlled Substance Monitoring Possible medication side effects, risk of tolerance/dependence & alternative treatments discussed.;No signs of potential drug abuse or diversion identified.

## 2025-08-04 RX ORDER — LOSARTAN POTASSIUM 100 MG/1
100 TABLET ORAL DAILY
Qty: 90 TABLET | Refills: 1 | Status: SHIPPED | OUTPATIENT
Start: 2025-08-04

## 2025-08-12 RX ORDER — AMLODIPINE BESYLATE 5 MG/1
5 TABLET ORAL DAILY
Qty: 90 TABLET | Refills: 1 | Status: SHIPPED | OUTPATIENT
Start: 2025-08-12

## 2025-08-12 RX ORDER — METFORMIN HYDROCHLORIDE 750 MG/1
750 TABLET, EXTENDED RELEASE ORAL DAILY
Qty: 90 TABLET | Refills: 1 | Status: SHIPPED | OUTPATIENT
Start: 2025-08-12

## (undated) DEVICE — NEEDLE QUINCKE 22GX5": Brand: MEDLINE

## (undated) DEVICE — MARKER SKIN MINI PUR FINE REGULAR TIP W RUL XL PREP RESIST

## (undated) DEVICE — AVANOS* TUOHY EPIDURAL NEEDLE: Brand: AVANOS

## (undated) DEVICE — NERVE BLOCK TRAY: Brand: MEDLINE INDUSTRIES, INC.

## (undated) DEVICE — EPIDURAL TRAY: Brand: MEDLINE INDUSTRIES, INC.